# Patient Record
Sex: FEMALE | Race: BLACK OR AFRICAN AMERICAN
[De-identification: names, ages, dates, MRNs, and addresses within clinical notes are randomized per-mention and may not be internally consistent; named-entity substitution may affect disease eponyms.]

---

## 2017-10-31 ENCOUNTER — HOSPITAL ENCOUNTER (EMERGENCY)
Dept: HOSPITAL 62 - ER | Age: 47
Discharge: HOME | End: 2017-10-31
Payer: SELF-PAY

## 2017-10-31 VITALS — DIASTOLIC BLOOD PRESSURE: 94 MMHG | SYSTOLIC BLOOD PRESSURE: 154 MMHG

## 2017-10-31 DIAGNOSIS — R51: ICD-10-CM

## 2017-10-31 DIAGNOSIS — M54.2: ICD-10-CM

## 2017-10-31 DIAGNOSIS — B02.9: Primary | ICD-10-CM

## 2017-10-31 DIAGNOSIS — R03.0: ICD-10-CM

## 2017-10-31 DIAGNOSIS — R11.0: ICD-10-CM

## 2017-10-31 DIAGNOSIS — R21: ICD-10-CM

## 2017-10-31 PROCEDURE — 99282 EMERGENCY DEPT VISIT SF MDM: CPT

## 2017-10-31 NOTE — ER DOCUMENT REPORT
HPI





- HPI


Patient complains to provider of: Skin rash, head and neck pain


Onset: Other - 2 days


Onset/Duration: Persistent


Quality of pain: Sharp


Pain Level: 5


Context: 





Patient complains of pain to left occipital scalp area and left side of head.  

Patient does complain of the skin rash to posterior neck and left cheek area.  

Patient reports nausea.  Patient denies any fever.


Associated Symptoms: Headache, Other - Skin rash.  denies: Vomiting


Exacerbated by: Denies


Relieved by: Denies


Similar symptoms previously: No


Recently seen / treated by doctor: No





- ROS


ROS below otherwise negative: Yes


Systems Reviewed and Negative: Yes All other systems reviewed and negative





- CONSTITUTIONAL


Constitutional: DENIES: Fever





- NEURO


Neurology: REPORTS: Headache





- GASTROINTESTINAL


Gastrointestinal: REPORTS: Nausea.  DENIES: Patient vomiting





- MUSCULOSKELETAL


Musculoskeletal: REPORTS: Neck Pain





- DERM


Skin Color: Normal


Skin Problems: Rash





Past Medical History





- General


Information source: Patient





- Social History


Smoking Status: Never Smoker


Frequency of alcohol use: None


Drug Abuse: None


Occupation: [employer]


Lives with: Family


Family History: Reviewed & Not Pertinent


Patient has suicidal ideation: No


Patient has homicidal ideation: No


Renal/ Medical History: Denies: Hx Peritoneal Dialysis


Psychiatric Medical History: Reports: Hx Anxiety, Hx Depression


Past Surgical History: Reports: Hx Gynecologic Surgery, Hx Orthopedic Surgery





Vertical Provider Document





- CONSTITUTIONAL


Agree With Documented VS: Yes


Exam Limitations: No Limitations


General Appearance: WD/WN, No Apparent Distress





- INFECTION CONTROL


TRAVEL OUTSIDE OF THE [place] IN LAST 30 DAYS: No





- HEENT


HEENT: Atraumatic, Normocephalic





- NECK


Neck: Normal Inspection, Supple.  negative: Lymphadenopathy-Left, 

Lymphadenopathy-Right





- RESPIRATORY


Respiratory: Breath Sounds Normal, No Respiratory Distress


O2 Sat by Pulse Oximetry: 100





- CARDIOVASCULAR


Cardiovascular: Regular Rate, Regular Rhythm, No Murmur





- BACK


Back: Normal Inspection





- MUSCULOSKELETAL/EXTREMETIES


Musculoskeletal/Extremeties: MAEW





- NEURO


Level of Consciousness: Awake, Alert, Appropriate


Motor/Sensory: No Motor Deficit





- DERM


Integumentary: Warm, Dry, Rash - Vesicular rash to left posterior occipital 

area of scalp, excoriated papular rash to left posterior neck area, few 

scattered excoriated skin lesions to left lower cheek area





Course





- Re-evaluation


Re-evalutation: 





10/31/17 05:47


Pt with rash concerning for shingles and a C3 nerve dermatome distribution.  

Discussed worsening symptoms that patient should return immediately for.  

Patient verbalized understanding and agrees with plan of care.





- Vital Signs


Vital signs: 


 











Temp Pulse Resp BP Pulse Ox


 


 97.6 F   75   20   154/94 H  100 


 


 10/31/17 03:58  10/31/17 03:58  10/31/17 03:58  10/31/17 03:58  10/31/17 03:58














Discharge





- Discharge


Clinical Impression: 


 Elevated blood pressure reading





Shingles


Qualifiers:


 Herpes zoster complications: without complications Qualified Code(s): B02.9 - 

Zoster without complications





Condition: Stable


Disposition: HOME, SELF-CARE


Instructions:  Oral Narcotic Medication ([facility]), Shingles ([facility])


Additional Instructions: 


Return immediately for any new or worsening symptoms





Followup with your primary care provider, call tomorrow to make a followup 

appointment








Prescriptions: 


Oxycodone HCl/Acetaminophen [Percocet 5-325 mg Tablet] 1 - 2 tab PO ASDIR PRN #

15 tablet


 PRN Reason: 


Valacyclovir HCl [Valacyclovir] 1,000 mg PO TID #21 tablet


Forms:  Elevated Blood Pressure, Return to Work


Referrals: 


DARRELL PASCUAL MD [EMERITUS] - Follow up as needed

## 2018-10-03 ENCOUNTER — HOSPITAL ENCOUNTER (OUTPATIENT)
Dept: HOSPITAL 62 - WI | Age: 48
End: 2018-10-03
Attending: INTERNAL MEDICINE
Payer: COMMERCIAL

## 2018-10-03 DIAGNOSIS — Z12.31: Primary | ICD-10-CM

## 2018-10-03 PROCEDURE — 77067 SCR MAMMO BI INCL CAD: CPT

## 2018-10-04 NOTE — WOMENS IMAGING REPORT
EXAM DESCRIPTION:  BILAT SCREENING MAMMO W/CAD



COMPLETED DATE/TIME:  10/3/2018 7:52 am



REASON FOR STUDY:  SCREENING MAMMO Z12.31  ENCNTR SCREEN MAMMOGRAM FOR MALIGNANT NEOPLASM OF GERARDO



COMPARISON:  None.



TECHNIQUE:  Standard craniocaudal and mediolateral oblique views of each breast recorded using digita
l acquisition.



LIMITATIONS:  None.



FINDINGS:  No masses, calcifications or architectural distortion. No areas of suspicion.

Read with the assistance of CAD.

.[facility] - R2 Cenova Version 1.3

.[facility] - R2 Cenova Version 1.3

.[facility] - R2 Cenova Version 2.4

.[facility] [place] R2 [place]ova Version 2.4

.[facility] - R2  Version 9.2



IMPRESSION:  NORMAL MAMMOGRAM.  BIRADS 1.



BREAST DENSITY:  b. There are scattered areas of fibroglandular density.



BIRAD:  1 NEGATIVE



RECOMMENDATION:  ROUTINE SCREENING

Please continue yearly bilateral screening mammography/tomosynthesis in October 2019



COMMENT:  The patient has been notified of the results by letter per SA requirements. Additional no
tification policies are in place for contacting patient with suspicious or incomplete findings.

Quality ID #225: The American College of Radiology recommends an annual screening mammogram for women
 aged 40 years or over. This facility utilizes a reminder system to ensure that all patients receive 
reminder letters, and/or direct phone calls for appointments. This includes reminders for routine scr
eening mammograms, diagnostic mammograms, or other Breast Imaging Interventions when appropriate.  Th
is patient will be placed in the appropriate reminder system.

The American College of Radiology (ACR) has developed recommendations for screening MRI of the breast
s in certain patient populations, to be used in conjunction with mammography.  Breast MRI surveillanc
e may be appropriate for women with more than 20% lifetime risk of developing breast cancer  as deter
mined by genetic testing, significant family history of the disease, or history of mantle radiation f
or Hodgkins Disease.  ACR Practice Guidelines 2008.



TECHNICAL DOCUMENTATION:  FINDING NUMBER: (1)

ASSESSMENT: (1)

JOB ID:  [id number]

 2011 [employer]- All Rights Reserved



Reading location - IP/workstation name: [facility]

## 2019-01-30 LAB
ANION GAP SERPL CALC-SCNC: 9 MMOL/L (ref 5–19)
BUN SERPL-MCNC: 20 MG/DL (ref 7–20)
CALCIUM: 9.7 MG/DL (ref 8.4–10.2)
CHLORIDE SERPL-SCNC: 108 MMOL/L (ref 98–107)
CO2 SERPL-SCNC: 26 MMOL/L (ref 22–30)
ERYTHROCYTE [DISTWIDTH] IN BLOOD BY AUTOMATED COUNT: 13.7 % (ref 11.5–14)
GLUCOSE SERPL-MCNC: 104 MG/DL (ref 75–110)
HCT VFR BLD CALC: 37.5 % (ref 36–47)
HGB BLD-MCNC: 12.8 G/DL (ref 12–15.5)
MCH RBC QN AUTO: 30.5 PG (ref 27–33.4)
MCHC RBC AUTO-ENTMCNC: 34.1 G/DL (ref 32–36)
MCV RBC AUTO: 90 FL (ref 80–97)
PLATELET # BLD: 280 10^3/UL (ref 150–450)
POTASSIUM SERPL-SCNC: 4.8 MMOL/L (ref 3.6–5)
RBC # BLD AUTO: 4.19 10^6/UL (ref 3.72–5.28)
SODIUM SERPL-SCNC: 143 MMOL/L (ref 137–145)
WBC # BLD AUTO: 5.4 10^3/UL (ref 4–10.5)

## 2019-02-06 ENCOUNTER — HOSPITAL ENCOUNTER (OUTPATIENT)
Dept: HOSPITAL 62 - OROUT | Age: 49
Discharge: HOME | End: 2019-02-06
Attending: SURGERY
Payer: COMMERCIAL

## 2019-02-06 VITALS — DIASTOLIC BLOOD PRESSURE: 97 MMHG | SYSTOLIC BLOOD PRESSURE: 148 MMHG

## 2019-02-06 DIAGNOSIS — Z87.891: ICD-10-CM

## 2019-02-06 DIAGNOSIS — Z88.5: ICD-10-CM

## 2019-02-06 DIAGNOSIS — E55.9: ICD-10-CM

## 2019-02-06 DIAGNOSIS — Z79.1: ICD-10-CM

## 2019-02-06 DIAGNOSIS — E78.00: ICD-10-CM

## 2019-02-06 DIAGNOSIS — M06.9: ICD-10-CM

## 2019-02-06 DIAGNOSIS — R26.81: ICD-10-CM

## 2019-02-06 DIAGNOSIS — Z88.8: ICD-10-CM

## 2019-02-06 DIAGNOSIS — I10: ICD-10-CM

## 2019-02-06 DIAGNOSIS — D17.1: Primary | ICD-10-CM

## 2019-02-06 DIAGNOSIS — Z79.899: ICD-10-CM

## 2019-02-06 PROCEDURE — 85027 COMPLETE CBC AUTOMATED: CPT

## 2019-02-06 PROCEDURE — 36415 COLL VENOUS BLD VENIPUNCTURE: CPT

## 2019-02-06 PROCEDURE — 88304 TISSUE EXAM BY PATHOLOGIST: CPT

## 2019-02-06 PROCEDURE — 80048 BASIC METABOLIC PNL TOTAL CA: CPT

## 2019-02-06 PROCEDURE — 21933 EXC BACK TUM DEEP 5 CM/>: CPT

## 2019-02-06 NOTE — OPERATIVE REPORT
Operative Report


DATE OF SURGERY: 02/06/19


PREOPERATIVE DIAGNOSIS: Left upper back lipoma


POSTOPERATIVE DIAGNOSIS: Same


OPERATION: Complete excision of fibrofatty lipoma left upper back


SURGEON: FERNANDA ALBARRAN


1ST ASSISTANT: FIOR PERRY


ANESTHESIA: LMAC


TISSUE REMOVED OR ALTERED: Fibrofatty lipoma in components


COMPLICATIONS: 





None


ESTIMATED BLOOD LOSS: Scant


INTRAOPERATIVE FINDINGS: See below


PROCEDURE: 





The patient was in the preop holding area the left upper back lipoma was marked.

 She was then taken to the main operating room where LMAC anesthesia was 

induced.  She is placed in the right side down left side up decubitus position. 

Left upper back was prepped and draped sterile fashion





Surgical plan surgical timeout were conducted.





Markings were made on the skin for a planned transverse open excisional biopsy 

removal of the left upper back lipoma.  Skin was anesthetized with 1% plain 

lidocaine.  Approximately 6 cm incision was made full-thickness through the skin

overlying the lipoma.  The lipoma was now excised from the deep subcutaneous 

tissue all the way down to the fascia of the back using electrocautery.  This 

was a multilobulated, fibrofatty lipoma that came out in lobules.  Small 

vascular pedicles were cauterized as encountered.  After the debulking of the 

lipoma, the mass was felt to be excised in its entirety.  Hemostasis was 

excellent wound closed in layers with 3-0 Vicryl benzoin Steri-Strips and bulky 

dressing applied.





Patient tolerated procedure well, taken to recovery room in stable condition.





The physician assistant, Ms. Woods, provided assistance during this case by: 

Assisting with retracting tissue, instillation of local anesthesia and closure 

of skin incisions.

## 2019-02-06 NOTE — DISCHARGE SUMMARY
Discharge Summary (SDC)





- Discharge


Final Diagnosis: 





Left upper back lipoma


Date of Surgery: 02/06/19


Discharge Date: 02/06/19


Condition: Good


Treatment or Instructions: 


WOUND CARE: 


You may remove the gauze and tape in 48 hours. Leave steri strips (paper 

bandaids) in place until follow up. After 48 hours you may shower with warm 

water/soap, pat dry and cover if needed. Monitor the wound for signs of 

infection: drainage, redness, swelling, fever, chills.





FOLLOW UP:


You may follow up at [facility] in 7-10 days for evaluation. Call 

clinic sooner with questions/concerns. 


Prescriptions: 


Ketorolac Tromethamine [Toradol 10 mg Tablet] 10 mg PO Q6HP PRN #20 tablet


 PRN Reason: 


Referrals: 


SOUMYA OVERTON MD [Primary Care Provider] - 


Discharge Diet: As Tolerated


Discharge Activity: No Lifting/Push/Pulling, Walk Frequently


Report the Following to Your Physician Immediately: Fever over 101 Degrees, 

Unusual Bleeding, Redness, Increased Soreness, Drainage-Foul Smelling

## 2019-10-24 ENCOUNTER — HOSPITAL ENCOUNTER (OUTPATIENT)
Dept: HOSPITAL 62 - ER | Age: 49
Setting detail: OBSERVATION
LOS: 3 days | Discharge: HOME HEALTH | End: 2019-10-27
Attending: INTERNAL MEDICINE | Admitting: INTERNAL MEDICINE
Payer: COMMERCIAL

## 2019-10-24 DIAGNOSIS — M13.842: ICD-10-CM

## 2019-10-24 DIAGNOSIS — I63.9: Primary | ICD-10-CM

## 2019-10-24 DIAGNOSIS — F40.240: ICD-10-CM

## 2019-10-24 DIAGNOSIS — M13.861: ICD-10-CM

## 2019-10-24 DIAGNOSIS — I10: ICD-10-CM

## 2019-10-24 DIAGNOSIS — G89.29: ICD-10-CM

## 2019-10-24 DIAGNOSIS — Z73.3: ICD-10-CM

## 2019-10-24 DIAGNOSIS — Z79.82: ICD-10-CM

## 2019-10-24 DIAGNOSIS — M13.841: ICD-10-CM

## 2019-10-24 DIAGNOSIS — R29.704: ICD-10-CM

## 2019-10-24 DIAGNOSIS — M54.9: ICD-10-CM

## 2019-10-24 DIAGNOSIS — E78.5: ICD-10-CM

## 2019-10-24 DIAGNOSIS — Z82.49: ICD-10-CM

## 2019-10-24 DIAGNOSIS — Z79.899: ICD-10-CM

## 2019-10-24 DIAGNOSIS — F41.8: ICD-10-CM

## 2019-10-24 DIAGNOSIS — G81.94: ICD-10-CM

## 2019-10-24 DIAGNOSIS — F17.200: ICD-10-CM

## 2019-10-24 DIAGNOSIS — F11.20: ICD-10-CM

## 2019-10-24 DIAGNOSIS — F12.10: ICD-10-CM

## 2019-10-24 DIAGNOSIS — M47.892: ICD-10-CM

## 2019-10-24 LAB
ADD MANUAL DIFF: NO
ALBUMIN SERPL-MCNC: 4.7 G/DL (ref 3.5–5)
ALP SERPL-CCNC: 95 U/L (ref 38–126)
ANION GAP SERPL CALC-SCNC: 11 MMOL/L (ref 5–19)
APPEARANCE UR: (no result)
APTT BLD: 26.8 SEC (ref 23.5–35.8)
APTT PPP: (no result) S
AST SERPL-CCNC: 23 U/L (ref 14–36)
BARBITURATES UR QL SCN: NEGATIVE
BASOPHILS # BLD AUTO: 0.2 10^3/UL (ref 0–0.2)
BASOPHILS NFR BLD AUTO: 2.2 % (ref 0–2)
BILIRUB DIRECT SERPL-MCNC: 0.1 MG/DL (ref 0–0.4)
BILIRUB SERPL-MCNC: 0.6 MG/DL (ref 0.2–1.3)
BILIRUB UR QL STRIP: NEGATIVE
BUN SERPL-MCNC: 19 MG/DL (ref 7–20)
CALCIUM: 9.8 MG/DL (ref 8.4–10.2)
CHLORIDE SERPL-SCNC: 106 MMOL/L (ref 98–107)
CO2 SERPL-SCNC: 25 MMOL/L (ref 22–30)
EOSINOPHIL # BLD AUTO: 0.1 10^3/UL (ref 0–0.6)
EOSINOPHIL NFR BLD AUTO: 0.6 % (ref 0–6)
ERYTHROCYTE [DISTWIDTH] IN BLOOD BY AUTOMATED COUNT: 13.9 % (ref 11.5–14)
GLUCOSE SERPL-MCNC: 92 MG/DL (ref 75–110)
GLUCOSE UR STRIP-MCNC: NEGATIVE MG/DL
HCT VFR BLD CALC: 41.3 % (ref 36–47)
HGB BLD-MCNC: 13.7 G/DL (ref 12–15.5)
INR PPP: 0.92
KETONES UR STRIP-MCNC: (no result) MG/DL
LYMPHOCYTES # BLD AUTO: 4 10^3/UL (ref 0.5–4.7)
LYMPHOCYTES NFR BLD AUTO: 37.1 % (ref 13–45)
MCH RBC QN AUTO: 29.5 PG (ref 27–33.4)
MCHC RBC AUTO-ENTMCNC: 33.1 G/DL (ref 32–36)
MCV RBC AUTO: 89 FL (ref 80–97)
METHADONE UR QL SCN: NEGATIVE
MONOCYTES # BLD AUTO: 0.9 10^3/UL (ref 0.1–1.4)
MONOCYTES NFR BLD AUTO: 8 % (ref 3–13)
NEUTROPHILS # BLD AUTO: 5.6 10^3/UL (ref 1.7–8.2)
NEUTS SEG NFR BLD AUTO: 52.1 % (ref 42–78)
NITRITE UR QL STRIP: NEGATIVE
PCP UR QL SCN: NEGATIVE
PH UR STRIP: 5 [PH] (ref 5–9)
PLATELET # BLD: 188 10^3/UL (ref 150–450)
POTASSIUM SERPL-SCNC: 3.8 MMOL/L (ref 3.6–5)
PROT SERPL-MCNC: 7.8 G/DL (ref 6.3–8.2)
PROT UR STRIP-MCNC: 30 MG/DL
PROTHROMBIN TIME: 12.3 SEC (ref 11.4–15.4)
RBC # BLD AUTO: 4.65 10^6/UL (ref 3.72–5.28)
SP GR UR STRIP: 1.04
TOTAL CELLS COUNTED % (AUTO): 100 %
URINE AMPHETAMINES SCREEN: NEGATIVE
URINE BENZODIAZEPINES SCREEN: NEGATIVE
URINE COCAINE SCREEN: NEGATIVE
URINE MARIJUANA (THC) SCREEN: (no result)
UROBILINOGEN UR-MCNC: 2 MG/DL (ref ?–2)
WBC # BLD AUTO: 10.7 10^3/UL (ref 4–10.5)

## 2019-10-24 PROCEDURE — 85652 RBC SED RATE AUTOMATED: CPT

## 2019-10-24 PROCEDURE — G0378 HOSPITAL OBSERVATION PER HR: HCPCS

## 2019-10-24 PROCEDURE — 70544 MR ANGIOGRAPHY HEAD W/O DYE: CPT

## 2019-10-24 PROCEDURE — A9576 INJ PROHANCE MULTIPACK: HCPCS

## 2019-10-24 PROCEDURE — 96361 HYDRATE IV INFUSION ADD-ON: CPT

## 2019-10-24 PROCEDURE — 96374 THER/PROPH/DIAG INJ IV PUSH: CPT

## 2019-10-24 PROCEDURE — 36415 COLL VENOUS BLD VENIPUNCTURE: CPT

## 2019-10-24 PROCEDURE — 80048 BASIC METABOLIC PNL TOTAL CA: CPT

## 2019-10-24 PROCEDURE — 86140 C-REACTIVE PROTEIN: CPT

## 2019-10-24 PROCEDURE — 80307 DRUG TEST PRSMV CHEM ANLYZR: CPT

## 2019-10-24 PROCEDURE — 93010 ELECTROCARDIOGRAM REPORT: CPT

## 2019-10-24 PROCEDURE — 70547 MR ANGIOGRAPHY NECK W/O DYE: CPT

## 2019-10-24 PROCEDURE — 85730 THROMBOPLASTIN TIME PARTIAL: CPT

## 2019-10-24 PROCEDURE — 80053 COMPREHEN METABOLIC PANEL: CPT

## 2019-10-24 PROCEDURE — 72158 MRI LUMBAR SPINE W/O & W/DYE: CPT

## 2019-10-24 PROCEDURE — 72156 MRI NECK SPINE W/O & W/DYE: CPT

## 2019-10-24 PROCEDURE — 99285 EMERGENCY DEPT VISIT HI MDM: CPT

## 2019-10-24 PROCEDURE — 93005 ELECTROCARDIOGRAM TRACING: CPT

## 2019-10-24 PROCEDURE — 97530 THERAPEUTIC ACTIVITIES: CPT

## 2019-10-24 PROCEDURE — 85025 COMPLETE CBC W/AUTO DIFF WBC: CPT

## 2019-10-24 PROCEDURE — 81001 URINALYSIS AUTO W/SCOPE: CPT

## 2019-10-24 PROCEDURE — 72157 MRI CHEST SPINE W/O & W/DYE: CPT

## 2019-10-24 PROCEDURE — 85610 PROTHROMBIN TIME: CPT

## 2019-10-24 PROCEDURE — 70450 CT HEAD/BRAIN W/O DYE: CPT

## 2019-10-24 PROCEDURE — 97163 PT EVAL HIGH COMPLEX 45 MIN: CPT

## 2019-10-24 PROCEDURE — 70551 MRI BRAIN STEM W/O DYE: CPT

## 2019-10-24 NOTE — RADIOLOGY REPORT (SQ)
EXAM DESCRIPTION:  CT HEAD WITHOUT



COMPLETED DATE/TIME:  10/24/2019 4:14 pm



REASON FOR STUDY:  Headache left side, left-sided weakness



COMPARISON:  None.



TECHNIQUE:  Axial images acquired through the brain without intravenous contrast.  Images reviewed wi
th bone, brain and subdural windows.  Additional sagittal and coronal reconstructions were generated.
 Images stored on PACS.

All CT scanners at this facility use dose modulation, iterative reconstruction, and/or weight based d
osing when appropriate to reduce radiation dose to as low as reasonably achievable (ALARA).

[facility]: Dose Right  [facility]: CareDose    [facility]: Dose Right    [facility]: Teradose 4D    [facility]: Smart Yummy Food



RADIATION DOSE:   mGy.



LIMITATIONS:  None.



FINDINGS:  VENTRICLES: Normal size and contour.

CEREBRUM: No masses.  No hemorrhage.  No midline shift.  No evidence for acute infarction. Normal gra
y/white matter differentiation. No areas of low density in the white matter.

CEREBELLUM: No masses.  No hemorrhage.  No alteration of density.  No evidence for acute infarction.

EXTRAAXIAL SPACES: No fluid collections.  No masses.

ORBITS AND GLOBE: No intra- or extraconal masses.  Normal contour of globe without masses.

CALVARIUM: No fracture.

PARANASAL SINUSES: No fluid or mucosal thickening.

SOFT TISSUES: No mass or hematoma.

OTHER: No other significant finding.



IMPRESSION:  NORMAL BRAIN CT WITHOUT CONTRAST.

EVIDENCE OF ACUTE STROKE: NO.



COMMENT:  Quality ID # 436: Final reports with documentation of one or more dose reduction techniques
 (e.g., Automated exposure control, adjustment of the mA and/or kV according to patient size, use of 
iterative reconstruction technique)



TECHNICAL DOCUMENTATION:  JOB ID:  [id number]

 2011 [employer]- All Rights Reserved



Reading location - IP/workstation name: [email or web address]

## 2019-10-24 NOTE — ER DOCUMENT REPORT
ED General





- General


Chief Complaint: Headache


Stated Complaint: LEFT SIDE WEAKNESS


Time Seen by Provider: 10/24/19 15:20


Mode of Arrival: Wheelchair


Notes: 





Patient is a 48-year-old female with history of hypertension, hyperlipidemia 

that presents to the emergency department for chief complaint of headache and 

left-sided weakness.  Patient reports that on Saturday she started having 

weakness in the left side of her body, and did have some slurred speech at that 

time, that has since improved, but the weakness has persisted, she is gotten 

some improvement, but at this point she is unable to stand or walk.  She did try

to go to pain management and they advised to come to the emergency department as

they are concerned that she may have had a stroke over the past few days.  She 

denies prior history of stroke, she does not take any blood thinners.  She has 

numbness on her left side as well as weakness.  She states that her speech is 

gotten a little bit better, but her weakness is not.  She denies history of mi

graines.  She does currently rate her headache as a 3 out of 10 and describes it

as a throbbing sensation on the left side of her head.  Denies any photophobia, 

vision changes or vision loss.  She also denies having any associated nausea or 

vomiting.





Past Medical History: Hypertension, hyperlipidemia, chronic back pain


Past Surgical History: Denies recent or pertinent surgical history


Social History: Denies tobacco, alcohol, admits to marijuana use


Family History: Reviewed and noncontributory for presenting illness


Allergies: Reviewed, see documented allergy list. 





REVIEW OF SYSTEMS:


Other than noted above, the 12 point review of systems was reviewed with the 

patient and were negative, all pertinent findings are included in the HPI.





PHYSICAL EXAMINATION:





Vital signs reviewed, nursing noted reviewed. 





GENERAL: Well-appearing, well-nourished and in no acute distress.





HEAD: Atraumatic, normocephalic.





EYES: Eyes appear normal, extraocular movements intact, sclera anicteric, 

conjunctiva are normal.





ENT: nares patent, oropharynx clear without exudates.  Moist mucous membranes.





NECK: Normal range of motion, supple without lymphadenopathy





LUNGS: Breath sounds clear to auscultation bilaterally and equal.  No wheezes 

rales or rhonchi.





HEART: Regular rate and rhythm without murmurs





ABDOMEN: Soft, nontender, normoactive bowel sounds.  No rebound, guarding, or 

rigidity. No masses appreciated.





EXTREMITIES: Nontender, good range of motion, no pitting or edema.  





NEUROLOGICAL: Patient has a slight facial droop on the left, speech is intact, 

no aphasia, she does have weakness in the left upper and lower extremity, with 

slightly decreased sensation bilaterally, her NIH stroke scale score is 4 based 

on her lower extremity weakness, upper extremity and decreased sensation on the 

left.





PSYCH: Mildly anxious but answers questions appropriately





SKIN: Warm, Dry, normal turgor, no rashes or lesions noted on exposed skin





TRAVEL OUTSIDE OF THE [place] IN LAST 30 DAYS: No





- Related Data


Allergies/Adverse Reactions: 


                                        





meperidine [From Demerol] Allergy (Severe, Verified 10/24/19 15:21)


   Anaphylaxis


lisinopril Allergy (Intermediate, Verified 10/24/19 15:21)


   Dizziness


latex Allergy (Verified 10/24/19 15:21)


   Hives











Past Medical History





- General


Information source: Patient





- Social History


Smoking Status: Current Every Day Smoker


Chew tobacco use (# tins/day): No


Frequency of alcohol use: Occasional


Drug Abuse: Marijuana


Family History: Reviewed & Not Pertinent


Patient has suicidal ideation: No


Patient has homicidal ideation: No





- Past Medical History


Cardiac Medical History: Reports: Hx Hypertension - ON MEDICATIONS


   Denies: Hx Coronary Artery Disease, Hx Heart Attack


Pulmonary Medical History: 


   Denies: Hx Asthma, Hx Bronchitis, Hx COPD, Hx Pneumonia


Neurological Medical History: Denies: Hx Cerebrovascular Accident, Hx Seizures


Renal/ Medical History: Denies: Hx Peritoneal Dialysis


Musculoskeletal Medical History: Reports Hx Arthritis - RIGHT KNEE, JUSTO HANDS


Psychiatric Medical History: Reports: Hx Anxiety, Hx Depression


Past Surgical History: Reports: Hx Gynecologic Surgery, Hx Orthopedic Surgery





- Immunizations


Hx Diphtheria, Pertussis, Tetanus Vaccination: Yes





Physical Exam





- Vital signs


Vitals: 


                                        











Pulse Resp BP Pulse Ox


 


 85   20   168/107 H  98 


 


 10/24/19 15:21  10/24/19 15:21  10/24/19 15:21  10/24/19 15:21














Course





- Re-evaluation


Re-evalutation: 





Patient seen and examined, vital signs reviewed, on my exam the patient was 

noted to have motor deficits on the left as well as a sensory deficit.  

Concerning for CVA, likely occurred on Saturday.  CT was negative, blood work 

was essentially unremarkable, patient was treated with IV fluids, Reglan for hea

dache, and started on aspirin, I placed a call to the hospitalist.  Dr. Beard 

requested ordering MRI before admission to the hospital.  MRI MRA of the head 

and MRA of the neck were ordered.  Patient states that she is claustrophobic, 

she was given Ativan prior to obtaining MRI to help with her claustrophobia.





MRI department was notified, and there is no longer performing MRIs, at this 

hour, there are hours have changed, do not perform them after 1030 or leading up

to 1030.  Would have to be performed in the morning.  Call was placed back to 

Dr. Beard to discuss this.





Patient symptoms are most consistent with likely ischemic CVA, discussed case 

again with Dr. Beard, to admit the patient is MRI is unavailable at this time,

to have MRIs performed in the morning and I think she does need to see physical 

therapy and Occupational Therapy given her current motor and sensory deficits.  

Patient was agreeable to this plan of care and understood.








Laboratory











  10/24/19 10/24/19 10/24/19





  15:52 15:52 15:52


 


WBC  10.7 H  


 


RBC  4.65  


 


Hgb  13.7  


 


Hct  41.3  


 


MCV  89  


 


MCH  29.5  


 


MCHC  33.1  


 


RDW  13.9  


 


Plt Count  188  


 


Lymph % (Auto)  37.1  


 


Mono % (Auto)  8.0  


 


Eos % (Auto)  0.6  


 


Baso % (Auto)  2.2 H  


 


Absolute Neuts (auto)  5.6  


 


Absolute Lymphs (auto)  4.0  


 


Absolute Monos (auto)  0.9  


 


Absolute Eos (auto)  0.1  


 


Absolute Basos (auto)  0.2  


 


Seg Neutrophils %  52.1  


 


PT   12.3 


 


INR   0.92 


 


APTT   26.8 


 


Sodium    141.6


 


Potassium    3.8


 


Chloride    106


 


Carbon Dioxide    25


 


Anion Gap    11


 


BUN    19


 


Creatinine    0.82


 


Est GFR ( Amer)    > 60


 


Est GFR (MDRD) Non-Af    > 60


 


Glucose    92


 


Calcium    9.8


 


Total Bilirubin    0.6


 


Direct Bilirubin    0.1


 


Neonat Total Bilirubin    Not Reportable


 


Neonat Direct Bilirubin    Not Reportable


 


Neonat Indirect Bili    Not Reportable


 


AST    23


 


ALT    19


 


Alkaline Phosphatase    95


 


Total Protein    7.8


 


Albumin    4.7


 


Urine Color   


 


Urine Appearance   


 


Urine pH   


 


Ur Specific Gravity   


 


Urine Protein   


 


Urine Glucose (UA)   


 


Urine Ketones   


 


Urine Blood   


 


Urine Nitrite   


 


Urine Bilirubin   


 


Urine Urobilinogen   


 


Ur Leukocyte Esterase   


 


Urine WBC (Auto)   


 


Urine RBC (Auto)   


 


U Hyaline Cast (Auto)   


 


Urine Bacteria (Auto)   


 


Squamous Epi Cells Auto   


 


Urine Mucus (Auto)   


 


Urine Ascorbic Acid   


 


Urine Opiates Screen   


 


Urine Methadone Screen   


 


Ur Barbiturates Screen   


 


Ur Phencyclidine Scrn   


 


Ur Amphetamines Screen   


 


U Benzodiazepines Scrn   


 


Urine Cocaine Screen   


 


U Marijuana (THC) Screen   














  10/24/19 10/24/19





  15:52 15:52


 


WBC  


 


RBC  


 


Hgb  


 


Hct  


 


MCV  


 


MCH  


 


MCHC  


 


RDW  


 


Plt Count  


 


Lymph % (Auto)  


 


Mono % (Auto)  


 


Eos % (Auto)  


 


Baso % (Auto)  


 


Absolute Neuts (auto)  


 


Absolute Lymphs (auto)  


 


Absolute Monos (auto)  


 


Absolute Eos (auto)  


 


Absolute Basos (auto)  


 


Seg Neutrophils %  


 


PT  


 


INR  


 


APTT  


 


Sodium  


 


Potassium  


 


Chloride  


 


Carbon Dioxide  


 


Anion Gap  


 


BUN  


 


Creatinine  


 


Est GFR ( Amer)  


 


Est GFR (MDRD) Non-Af  


 


Glucose  


 


Calcium  


 


Total Bilirubin  


 


Direct Bilirubin  


 


Neonat Total Bilirubin  


 


Neonat Direct Bilirubin  


 


Neonat Indirect Bili  


 


AST  


 


ALT  


 


Alkaline Phosphatase  


 


Total Protein  


 


Albumin  


 


Urine Color  DARK YELLOW 


 


Urine Appearance  CLOUDY 


 


Urine pH  5.0 


 


Ur Specific Gravity  1.040 


 


Urine Protein  30 H 


 


Urine Glucose (UA)  NEGATIVE 


 


Urine Ketones  TRACE H 


 


Urine Blood  NEGATIVE 


 


Urine Nitrite  NEGATIVE 


 


Urine Bilirubin  NEGATIVE 


 


Urine Urobilinogen  2.0 H 


 


Ur Leukocyte Esterase  NEGATIVE 


 


Urine WBC (Auto)  8 


 


Urine RBC (Auto)  4 


 


U Hyaline Cast (Auto)  1 


 


Urine Bacteria (Auto)  TRACE 


 


Squamous Epi Cells Auto  16 


 


Urine Mucus (Auto)  MOD 


 


Urine Ascorbic Acid  NEGATIVE 


 


Urine Opiates Screen   UNCONFIRMED POSITIVE


 


Urine Methadone Screen   NEGATIVE


 


Ur Barbiturates Screen   NEGATIVE


 


Ur Phencyclidine Scrn   NEGATIVE


 


Ur Amphetamines Screen   NEGATIVE


 


U Benzodiazepines Scrn   NEGATIVE


 


Urine Cocaine Screen   NEGATIVE


 


U Marijuana (THC) Screen   UNCONFIRMED POSITIVE











                                        





Head CT  10/24/19 15:24


IMPRESSION:  NORMAL BRAIN CT WITHOUT CONTRAST.


EVIDENCE OF ACUTE STROKE: NO.


 

















10/25/19 00:32








- Vital Signs


Vital signs: 


                                        











Temp Pulse Resp BP Pulse Ox


 


 98.6 F   68   20   139/94 H  100 


 


 10/24/19 21:31  10/24/19 19:41  10/24/19 21:31  10/24/19 21:31  10/24/19 21:31














- Laboratory


Result Diagrams: 


                                 10/24/19 15:52





                                 10/24/19 15:52


Laboratory results interpreted by me: 


                                        











  10/24/19 10/24/19





  15:52 15:52


 


WBC  10.7 H 


 


Baso % (Auto)  2.2 H 


 


Urine Protein   30 H


 


Urine Ketones   TRACE H


 


Urine Urobilinogen   2.0 H














- EKG Interpretation by Me


Additional EKG results interpreted by me: 





EKG demonstrates sinus rhythm with a ventricular rate of 59 bpm, normal axis, n

ormal intervals, no ST elevation.








Discharge





- Discharge


Clinical Impression: 


 Left-sided weakness





CVA (cerebral vascular accident)


Qualifiers:


 CVA mechanism: unspecified Qualified Code(s): I63.9 - Cerebral infarction, 

unspecified





Condition: Stable


Disposition: ADMITTED AS INPATIENT


Admitting Provider: Chirag (Hospitalist)


Unit Admitted: [facility]

## 2019-10-24 NOTE — ER DOCUMENT REPORT
ED Medical Screen (RME)





- General


Chief Complaint: S/S of Possible Stroke


Stated Complaint: LEFT SIDE WEAKNESS


Time Seen by Provider: 10/24/19 15:20


Primary Care Provider: 


SOUMYA OVERTON MD [Primary Care Provider] - Follow up as needed


Mode of Arrival: Wheelchair


Information source: Patient


Notes: 





48-year-old female presented to ED for complaint of left-sided weakness since 

Saturday.  She went today to pain management they sent her to the emergency room

to get rule out for stroke.  She states that she has left-sided head pain with 

some numbness and left-sided weakness.  She states she did not come to the 

hospital at that time she has waited until pain management center to the 

emergency room.  States she is on oxycodone but she does not know the dose.











I have greeted and performed a rapid initial assessment of this patient.  A 

comprehensive ED assessment and evaluation of the patient, analysis of test 

results and completion of medical decision making process will be conducted by 

an additional ED providers.


TRAVEL OUTSIDE OF THE [place] IN LAST 30 DAYS: No





- Related Data


Allergies/Adverse Reactions: 


                                        





meperidine [From Demerol] Allergy (Severe, Verified 02/06/19 07:35)


   Anaphylaxis


lisinopril Allergy (Intermediate, Verified 02/06/19 07:35)


   Dizziness


latex Allergy (Verified 02/06/19 07:35)


   Hives











Past Medical History





- Past Medical History


Cardiac Medical History: Reports: Hx Hypertension - ON MEDICATIONS


   Denies: Hx Coronary Artery Disease, Hx Heart Attack


Pulmonary Medical History: 


   Denies: Hx Asthma, Hx Bronchitis, Hx COPD, Hx Pneumonia


Neurological Medical History: Denies: Hx Cerebrovascular Accident, Hx Seizures


Renal/ Medical History: Denies: Hx Peritoneal Dialysis


Musculoskeltal Medical History: Reports Hx Arthritis - RIGHT KNEE, JUSTO HANDS


Psychiatric Medical History: Reports: Hx Anxiety, Hx Depression


Past Surgical History: Reports: Hx Gynecologic Surgery, Hx Orthopedic Surgery





- Immunizations


Hx Diphtheria, Pertussis, Tetanus Vaccination: Yes





Doctor's Discharge





- Discharge


Referrals: 


SOUMYA OVERTON MD [Primary Care Provider] - Follow up as needed

## 2019-10-25 LAB
ADD MANUAL DIFF: NO
ANION GAP SERPL CALC-SCNC: 10 MMOL/L (ref 5–19)
BASOPHILS # BLD AUTO: 0.1 10^3/UL (ref 0–0.2)
BASOPHILS NFR BLD AUTO: 0.7 % (ref 0–2)
BUN SERPL-MCNC: 15 MG/DL (ref 7–20)
CALCIUM: 8.5 MG/DL (ref 8.4–10.2)
CHLORIDE SERPL-SCNC: 109 MMOL/L (ref 98–107)
CO2 SERPL-SCNC: 22 MMOL/L (ref 22–30)
EOSINOPHIL # BLD AUTO: 0.1 10^3/UL (ref 0–0.6)
EOSINOPHIL NFR BLD AUTO: 0.8 % (ref 0–6)
ERYTHROCYTE [DISTWIDTH] IN BLOOD BY AUTOMATED COUNT: 13.7 % (ref 11.5–14)
GLUCOSE SERPL-MCNC: 102 MG/DL (ref 75–110)
HCT VFR BLD CALC: 36.9 % (ref 36–47)
HGB BLD-MCNC: 12.3 G/DL (ref 12–15.5)
LYMPHOCYTES # BLD AUTO: 2.6 10^3/UL (ref 0.5–4.7)
LYMPHOCYTES NFR BLD AUTO: 31.4 % (ref 13–45)
MCH RBC QN AUTO: 29.6 PG (ref 27–33.4)
MCHC RBC AUTO-ENTMCNC: 33.5 G/DL (ref 32–36)
MCV RBC AUTO: 89 FL (ref 80–97)
MONOCYTES # BLD AUTO: 0.7 10^3/UL (ref 0.1–1.4)
MONOCYTES NFR BLD AUTO: 8.2 % (ref 3–13)
NEUTROPHILS # BLD AUTO: 4.9 10^3/UL (ref 1.7–8.2)
NEUTS SEG NFR BLD AUTO: 58.9 % (ref 42–78)
PLATELET # BLD: 214 10^3/UL (ref 150–450)
POTASSIUM SERPL-SCNC: 3.7 MMOL/L (ref 3.6–5)
RBC # BLD AUTO: 4.17 10^6/UL (ref 3.72–5.28)
TOTAL CELLS COUNTED % (AUTO): 100 %
WBC # BLD AUTO: 8.4 10^3/UL (ref 4–10.5)

## 2019-10-25 RX ADMIN — Medication SCH ML: at 15:28

## 2019-10-25 RX ADMIN — Medication SCH ML: at 21:22

## 2019-10-25 RX ADMIN — Medication SCH ML: at 05:25

## 2019-10-25 RX ADMIN — OXYCODONE AND ACETAMINOPHEN PRN TAB: 5; 325 TABLET ORAL at 18:41

## 2019-10-25 RX ADMIN — GABAPENTIN SCH MG: 300 CAPSULE ORAL at 21:22

## 2019-10-25 RX ADMIN — ASPIRIN SCH MG: 325 TABLET, DELAYED RELEASE ORAL at 09:14

## 2019-10-25 RX ADMIN — GABAPENTIN SCH: 300 CAPSULE ORAL at 15:00

## 2019-10-25 RX ADMIN — ATORVASTATIN CALCIUM SCH MG: 80 TABLET, FILM COATED ORAL at 21:22

## 2019-10-25 RX ADMIN — HEPARIN SODIUM SCH UNIT: 5000 INJECTION, SOLUTION INTRAVENOUS; SUBCUTANEOUS at 21:22

## 2019-10-25 RX ADMIN — HEPARIN SODIUM SCH UNIT: 5000 INJECTION, SOLUTION INTRAVENOUS; SUBCUTANEOUS at 05:25

## 2019-10-25 RX ADMIN — HEPARIN SODIUM SCH UNIT: 5000 INJECTION, SOLUTION INTRAVENOUS; SUBCUTANEOUS at 15:55

## 2019-10-25 RX ADMIN — GABAPENTIN SCH MG: 300 CAPSULE ORAL at 15:26

## 2019-10-25 NOTE — RADIOLOGY REPORT (SQ)
EXAM DESCRIPTION:  MRA HEAD WITHOUT



COMPLETED DATE/TIME:  10/25/2019 11:56 am



REASON FOR STUDY:  left sided weakness



COMPARISON:  MRI brain 10/25/2019

CT brain 10/24/2019



TECHNIQUE:  Axial 3-D time-of-flight acquisition imaging performed through the brain in the area of t
he [place].  Images reformatted using 3-D MIPS.



LIMITATIONS:  Mild motion artifact



FINDINGS:  SOURCE IMAGES: No unexpected findings on source images.  No large masses.

3-D MIP: No aneurysm.  No occlusions.  No significant stenosis.

OTHER: No other significant finding.



IMPRESSION:  NORMAL MRA OF THE [place].



TECHNICAL DOCUMENTATION:  JOB ID:  [id number]

 2011 [employer]- All Rights Reserved



Reading location - IP/workstation name: [email or web address]

## 2019-10-25 NOTE — PDOC H&P
History of Present Illness


Admission Date/PCP: 


  10/24/19 23:27





  SOUMYA OVERTON





Patient complains of: Headache and left-sided weakness


History of Present Illness: 


MARIS MESA is a 48 year old female with a past medical history of 

hypertension, tobacco, dyslipidemia, fibromyalgia, opiate dependent chronic 

pain, anxiety and depression.  She presents 6 days after the onset of a headache

which was followed by left-sided weakness and intermittent tingling of the tong

ue.  She has had no fluctuation of symptoms and it remained constant.  She 

ambulates with a cane at baseline and has been able to work as a  since the 

onset.  She was seen by pain management with deficits and referred to the 

emergency department for evaluation.  Her work-up is unremarkable she denies 

palpitations headache nausea vomiting.  She receives aspirin and referred to the

hospitalist for admission.  She denies previous episode, recent change in 

medication regiment.





Past Medical History


Cardiac Medical History: Reports: Hypertension - ON MEDICATIONS


   Denies: Coronary Artery Disease, Myocardial Infarction


Pulmonary Medical History: 


   Denies: Asthma, Bronchitis, Chronic Obstructive Pulmonary Disease (COPD), 

Pneumonia


Neurological Medical History: 


   Denies: Seizures


Musculoskeltal Medical History: Reports: Arthritis - RIGHT KNEE, JUSTO HANDS


Psychiatric Medical History: Reports: Depression - anxiety


Hematology: 


   Denies: Anemia





Past Surgical History


Past Surgical History: Reports: Orthopedic Surgery





Social History


Information Source: Patient, Emergency Med Personnel


Lives with: Spouse/Significant other


Smoking Status: Never Smoker


Electronic Cigarette use?: No


Frequency of Alcohol Use: None


Drugs: Marijuana


Hx Prescription Drug Abuse: No





- Advance Directive


Resuscitation Status: Full Code





Family History


Family History: Hypertension.  denies: CAD, CVA


Parental Family History Reviewed: Yes


Children Family History Reviewed: Yes


Sibling(s) Family History Reviewed.: Yes





Medication/Allergy


Home Medications: 








Atorvastatin Calcium [Lipitor 10 mg Tablet] 10 mg PO QHS 01/30/19 


Atenolol [Tenormin] 50 mg PO DAILY 10/25/19 


Ergocalciferol (Vitamin D2) [Drisdol 50,000 Unit (1.25MG) Capsule] 50,000 unit 

PO MO 10/25/19 


Gabapentin [Neurontin 300 mg Capsule] 300 mg PO Q8 10/25/19 


Losartan/Hydrochlorothiazide [Losartan-Hctz 100-25 mg Tab] 1 each PO DAILY 

10/25/19 


Oxycodone HCl/Acetaminophen [Percocet 5-325 mg Tablet] 1 tab PO QIDP PRN 

10/25/19 








Allergies/Adverse Reactions: 


                                        





meperidine [From Demerol] Allergy (Severe, Verified 10/24/19 15:21)


   Anaphylaxis


lisinopril Allergy (Intermediate, Verified 10/24/19 15:21)


   Dizziness


latex Allergy (Verified 10/24/19 15:21)


   Hives











Review of Systems


ROS unobtainable: Due to mental status - Patient felt an unreliable historian 

his answers affirmatively to all questions





Physical Exam


Vital Signs: 


                                        











Temp Pulse Resp BP Pulse Ox


 


 98.0 F   73   16   108/65   100 


 


 10/25/19 04:08  10/25/19 04:08  10/25/19 04:08  10/25/19 04:08  10/25/19 04:08








                                 Intake & Output











 10/23/19 10/24/19 10/25/19





 11:59 11:59 11:59


 


Intake Total   1000


 


Balance   1000


 


Weight   84.9 kg











General appearance: PRESENT: cooperative, mild distress, well-developed, well-

nourished


Head exam: PRESENT: atraumatic, normocephalic


Eye exam: PRESENT: conjunctiva pink, EOMI, PERRLA.  ABSENT: scleral icterus


Ear exam: PRESENT: normal external ear exam


Mouth exam: PRESENT: moist, tongue midline


Neck exam: ABSENT: carotid bruit, JVD, lymphadenopathy, thyromegaly


Respiratory exam: PRESENT: clear to auscultation justo.  ABSENT: rales, rhonchi, 

wheezes


Cardiovascular exam: PRESENT: RRR.  ABSENT: diastolic murmur, rubs, systolic 

murmur


Pulses: PRESENT: normal dorsalis pedis pul


Vascular exam: PRESENT: normal capillary refill


GI/Abdominal exam: PRESENT: normal bowel sounds, soft.  ABSENT: distended, 

guarding, mass, organolmegaly, rebound, tenderness


Rectal exam: PRESENT: deferred


Extremities exam: PRESENT: full ROM.  ABSENT: calf tenderness, clubbing, pedal 

edema, tenderness


Musculoskeletal exam: PRESENT: full ROM, other - 4+out of 5 strength on the left

side, questionable effort


Neurological exam: PRESENT: alert, awake, oriented to person, oriented to place,

oriented to time, oriented to situation, CN II-XII grossly intact.  ABSENT: 

motor sensory deficit


Psychiatric exam: PRESENT: anxious, depressed, unusual affect.  ABSENT: 

homicidal ideation, suicidal ideation


Skin exam: PRESENT: dry, intact, warm.  ABSENT: cyanosis, rash





Results


Laboratory Results: 


                                        





                                 10/24/19 15:52 





                                 10/24/19 15:52 





                                        











  10/24/19 10/24/19 10/24/19





  15:52 15:52 15:52


 


WBC  10.7 H  


 


RBC  4.65  


 


Hgb  13.7  


 


Hct  41.3  


 


MCV  89  


 


MCH  29.5  


 


MCHC  33.1  


 


RDW  13.9  


 


Plt Count  188  


 


Seg Neutrophils %  52.1  


 


Sodium   141.6 


 


Potassium   3.8 


 


Chloride   106 


 


Carbon Dioxide   25 


 


Anion Gap   11 


 


BUN   19 


 


Creatinine   0.82 


 


Est GFR ( Amer)   > 60 


 


Glucose   92 


 


Calcium   9.8 


 


Total Bilirubin   0.6 


 


AST   23 


 


Alkaline Phosphatase   95 


 


Total Protein   7.8 


 


Albumin   4.7 


 


Urine Color    DARK YELLOW


 


Urine Appearance    CLOUDY


 


Urine pH    5.0


 


Ur Specific Gravity    1.040


 


Urine Protein    30 H


 


Urine Glucose (UA)    NEGATIVE


 


Urine Ketones    TRACE H


 


Urine Blood    NEGATIVE


 


Urine Nitrite    NEGATIVE


 


Ur Leukocyte Esterase    NEGATIVE


 


Urine WBC (Auto)    8


 


Urine RBC (Auto)    4











Impressions: 


                                        





Head CT  10/24/19 15:24


IMPRESSION:  NORMAL BRAIN CT WITHOUT CONTRAST.


EVIDENCE OF ACUTE STROKE: NO.


 














Assessment and Plan





- Diagnosis


(1) CVA (cerebral vascular accident)


Qualifiers: 


   CVA mechanism: unspecified   Qualified Code(s): I63.9 - Cerebral infarction, 

unspecified   


Is this a current diagnosis for this admission?: Yes   


Plan: 


CVA care set deployed, follow-up MRI, carotid Doppler, lipid profile, PT eval








(2) Depression with anxiety


Is this a current diagnosis for this admission?: Yes   


Plan: 


Denies suicidal or homicidal ideation, counseled for cannabis cessation








(3) Cannabis abuse


Is this a current diagnosis for this admission?: Yes   


Plan: 


Counseling for substance cessation








(4) Chronic pain


Is this a current diagnosis for this admission?: Yes   


Plan: 


Limit narcotics, follow-up medication reconciliation








(5) Hypertension


Is this a current diagnosis for this admission?: Yes   


Plan: 


Permissive hypertension








(6) Dyslipidemia


Is this a current diagnosis for this admission?: Yes   


Plan: 


Statin ordered, follow-up lipid profile








(7) Left-sided weakness


Is this a current diagnosis for this admission?: Yes   


Plan: 


Unclear cause, follow-up PT eval and CVA work-up








- Time


Time Spent with patient: 25-34 minutes





- Inpatient Certification


Medical Necessity: Need Close Monitoring Due to Risk of Patient Decompensation

## 2019-10-25 NOTE — RADIOLOGY REPORT (SQ)
EXAM DESCRIPTION:  MRI HEAD WITHOUT



COMPLETED DATE/TIME:  10/25/2019 11:56 am



REASON FOR STUDY:  left sided weakness



COMPARISON:  None.



TECHNIQUE:  Multiplanar imaging includes non-contrasted T1, T2, FLAIR, and diffusion with ADC map seq
uences. Images stored on PACS.



LIMITATIONS:  None.



FINDINGS:  ANATOMY: No anomalies. Normal vascular flow voids. Pituitary fossa normal.

CSF SPACES: Normal in size and contour. No hemorrhage.

CEREBRUM: Sulci and gyri normal in size and contour. Normal white matter signal on FLAIR imaging.  No
 evidence of hemorrhage, mass, or extraaxial fluid collection.

POSTERIOR FOSSA: No signal alteration. No hemorrhage. No edema, masses or mass effect.  Internal domonique
tory canals, cerebello-pontine angles, mastoids normal.

DIFFUSION IMAGING: Negative for acute or sub-acute infarction.

ORBITS: No masses. Globes normal.

PARANASAL  SINUSES: No fluid levels.  Mucosa normal.

OTHER: No other significant finding.



IMPRESSION:  NORMAL MRI OF THE BRAIN WITHOUT INTRAVENOUS GADOLINIUM CONTRAST.

EVIDENCE OF ACUTE STROKE: NO.



TECHNICAL DOCUMENTATION:  JOB ID:  [id number]

 2011 [email or web address]- All Rights Reserved



Reading location - IP/workstation name: [email or web address]

## 2019-10-25 NOTE — EKG REPORT
SEVERITY:- BORDERLINE ECG -

SINUS RHYTHM

BORDERLINE T ABNORMALITIES, ANTERIOR LEADS

:

Confirmed by: Apple Mario MD 25-Oct-2019 19:06:32

## 2019-10-25 NOTE — PDOC PROGRESS REPORT
Subjective


Progress Note for:: 10/25/19


Subjective:: 


This is a 48 year old female with a past medical history of hypertension, 

tobacco, dyslipidemia, fibromyalgia, opiate dependent chronic pain, anxiety and 

depression who presented with left sided weakness with onset 6 days ago.  

Patient was admitted for possible subacute CVA.





No acute event overnight. Upon encounter, patient is comfortable.  She continues

to have left-sided weakness in both her left arm and left leg.  MRI studies 

pending.  Denies chest pain or shortness of breath.


Reason For Visit: 


CVA HTN TOBACCO








Physical Exam


Vital Signs: 


                                        











Temp Pulse Resp BP Pulse Ox


 


 97.8 F   62   16   136/81 H  100 


 


 10/25/19 07:53  10/25/19 07:53  10/25/19 07:53  10/25/19 07:53  10/25/19 07:53








                                 Intake & Output











 10/24/19 10/25/19 10/26/19





 06:59 06:59 06:59


 


Intake Total  1100 


 


Output Total  100 


 


Balance  1000 


 


Weight  187 lb 2.759 oz 











General appearance: PRESENT: no acute distress, well-developed, well-nourished


Head exam: PRESENT: atraumatic, normocephalic


Eye exam: PRESENT: conjunctiva pink, EOMI, PERRLA.  ABSENT: scleral icterus


Ear exam: PRESENT: normal external ear exam


Mouth exam: PRESENT: moist, tongue midline


Neck exam: ABSENT: carotid bruit, JVD, lymphadenopathy, thyromegaly


Respiratory exam: PRESENT: clear to auscultation wayne.  ABSENT: rales, rhonchi, 

wheezes


Cardiovascular exam: PRESENT: RRR.  ABSENT: diastolic murmur, rubs, systolic 

murmur


Pulses: PRESENT: normal dorsalis pedis pul


GI/Abdominal exam: PRESENT: normal bowel sounds, soft.  ABSENT: distended, 

guarding, mass, organolmegaly, rebound, tenderness


Rectal exam: PRESENT: deferred


Extremities exam: PRESENT: full ROM.  ABSENT: calf tenderness, clubbing, pedal 

edema


Neurological exam: PRESENT: alert, awake, oriented to person, oriented to place,

oriented to time, oriented to situation, CN II-XII grossly intact, motor sensory

deficit - 2/5 LSW





Results


Laboratory Results: 


                                        





                                 10/25/19 05:41 





                                 10/25/19 05:41 





                                        











  10/24/19 10/24/19 10/24/19





  15:52 15:52 15:52


 


WBC  10.7 H  


 


RBC  4.65  


 


Hgb  13.7  


 


Hct  41.3  


 


MCV  89  


 


MCH  29.5  


 


MCHC  33.1  


 


RDW  13.9  


 


Plt Count  188  


 


Seg Neutrophils %  52.1  


 


Sodium   141.6 


 


Potassium   3.8 


 


Chloride   106 


 


Carbon Dioxide   25 


 


Anion Gap   11 


 


BUN   19 


 


Creatinine   0.82 


 


Est GFR ( Amer)   > 60 


 


Glucose   92 


 


Calcium   9.8 


 


Total Bilirubin   0.6 


 


AST   23 


 


Alkaline Phosphatase   95 


 


Total Protein   7.8 


 


Albumin   4.7 


 


Urine Color    DARK YELLOW


 


Urine Appearance    CLOUDY


 


Urine pH    5.0


 


Ur Specific Gravity    1.040


 


Urine Protein    30 H


 


Urine Glucose (UA)    NEGATIVE


 


Urine Ketones    TRACE H


 


Urine Blood    NEGATIVE


 


Urine Nitrite    NEGATIVE


 


Ur Leukocyte Esterase    NEGATIVE


 


Urine WBC (Auto)    8


 


Urine RBC (Auto)    4














  10/25/19 10/25/19





  05:41 05:41


 


WBC  8.4 


 


RBC  4.17 


 


Hgb  12.3 


 


Hct  36.9 


 


MCV  89 


 


MCH  29.6 


 


MCHC  33.5 


 


RDW  13.7 


 


Plt Count  214 


 


Seg Neutrophils %  58.9 


 


Sodium   140.6


 


Potassium   3.7


 


Chloride   109 H


 


Carbon Dioxide   22


 


Anion Gap   10


 


BUN   15


 


Creatinine   0.65


 


Est GFR ([place])   > 60


 


Glucose   102


 


Calcium   8.5


 


Total Bilirubin  


 


AST  


 


Alkaline Phosphatase  


 


Total Protein  


 


Albumin  


 


Urine Color  


 


Urine Appearance  


 


Urine pH  


 


Ur Specific Gravity  


 


Urine Protein  


 


Urine Glucose (UA)  


 


Urine Ketones  


 


Urine Blood  


 


Urine Nitrite  


 


Ur Leukocyte Esterase  


 


Urine WBC (Auto)  


 


Urine RBC (Auto)  











Impressions: 


                                        





Head CT  10/24/19 15:24


IMPRESSION:  NORMAL BRAIN CT WITHOUT CONTRAST.


EVIDENCE OF ACUTE STROKE: NO.


 














Assessment and Plan





- Diagnosis


(1) Left-sided weakness


Is this a current diagnosis for this admission?: Yes   


Plan: 


Patient is admitted for possible subacute CVA.  CT of the head was unremarkable.

 MRI studies pending.








(2) CVA (cerebral vascular accident)


Qualifiers: 


   CVA mechanism: unspecified   Qualified Code(s): I63.9 - Cerebral infarction, 

unspecified   


Is this a current diagnosis for this admission?: Yes   


Plan: 


As per #1.








(3) Hypertension


Is this a current diagnosis for this admission?: Yes   





(4) Depression with anxiety


Is this a current diagnosis for this admission?: Yes   


Plan: 


Stable.








- Time


Time Spent with patient: 25-34 minutes

## 2019-10-25 NOTE — RADIOLOGY REPORT (SQ)
EXAM DESCRIPTION:  MRA NECK WITHOUT



COMPLETED DATE/TIME:  10/25/2019 11:56 am



REASON FOR STUDY:  left sided weakness



COMPARISON:  MRI brain same date, MRA exam [place]

CT brain 10/24/2019



TECHNIQUE:  Axial 2-D volume acquisition imaging through the extracranial carotid and vertebral arter
ies with reformatting using 3-D MIPS.



LIMITATIONS:  Mild motion artifact



FINDINGS:  RIGHT CAROTID ARTERY: No stenosis or occlusive changes.  Limited visualization of the orig
in.

LEFT CAROTID ARTERY: No stenosis or occlusive changes.  Limited visualization of the origin.

VERTEBRAL ARTERY: The extracranial portions of the vertebral basilar system are preserved without loyda
nosis. No aneurysmal dilatation or dissection is seen.

OTHER: No other significant finding.



IMPRESSION:  NO SIGNIFICANT STENOSIS.



COMMENT:  Quality ID #[id number]:  Measurements of distal internal carotid diameter were used as the denomin
ator for stenosis measurement.



TECHNICAL DOCUMENTATION:  JOB ID:  [id number]

 2011 [employer]- All Rights Reserved



Reading location - IP/workstation name: [email or web address]

## 2019-10-26 RX ADMIN — Medication SCH ML: at 06:06

## 2019-10-26 RX ADMIN — HEPARIN SODIUM SCH UNIT: 5000 INJECTION, SOLUTION INTRAVENOUS; SUBCUTANEOUS at 21:26

## 2019-10-26 RX ADMIN — OXYCODONE AND ACETAMINOPHEN PRN TAB: 5; 325 TABLET ORAL at 18:51

## 2019-10-26 RX ADMIN — OXYCODONE AND ACETAMINOPHEN PRN TAB: 5; 325 TABLET ORAL at 06:05

## 2019-10-26 RX ADMIN — OXYCODONE AND ACETAMINOPHEN PRN TAB: 5; 325 TABLET ORAL at 13:08

## 2019-10-26 RX ADMIN — GABAPENTIN SCH MG: 300 CAPSULE ORAL at 13:10

## 2019-10-26 RX ADMIN — GABAPENTIN SCH MG: 300 CAPSULE ORAL at 06:05

## 2019-10-26 RX ADMIN — ATORVASTATIN CALCIUM SCH MG: 80 TABLET, FILM COATED ORAL at 21:26

## 2019-10-26 RX ADMIN — GABAPENTIN SCH MG: 300 CAPSULE ORAL at 21:26

## 2019-10-26 RX ADMIN — HEPARIN SODIUM SCH: 5000 INJECTION, SOLUTION INTRAVENOUS; SUBCUTANEOUS at 13:09

## 2019-10-26 RX ADMIN — OXYCODONE AND ACETAMINOPHEN PRN TAB: 5; 325 TABLET ORAL at 00:34

## 2019-10-26 RX ADMIN — Medication SCH ML: at 18:52

## 2019-10-26 RX ADMIN — ASPIRIN SCH MG: 325 TABLET, DELAYED RELEASE ORAL at 11:13

## 2019-10-26 RX ADMIN — HEPARIN SODIUM SCH UNIT: 5000 INJECTION, SOLUTION INTRAVENOUS; SUBCUTANEOUS at 06:05

## 2019-10-26 RX ADMIN — Medication SCH ML: at 21:26

## 2019-10-26 NOTE — RADIOLOGY REPORT (SQ)
EXAM DESCRIPTION:  MRI THORACIC SPINE COMBO



COMPLETED DATE/TIME:  10/26/2019 3:28 pm



REASON FOR STUDY:  persistent significant LSW, normal brain imaging



COMPARISON:  None.



TECHNIQUE:  Sagittal and Axial imaging includes T1, T2, STIR and gradient echo sequences.  T1 post ga
dolinium sequences.



CONTRAST TYPE AND DOSE:  15 mL Dotarem.



RENAL FUNCTION:  Not indicated.  ACR Type II contrast agent associated with few, if any, unconfounded
 cases of NSF



LIMITATIONS:  None.



FINDINGS:  LOCALIZER: No worrisome findings.

ALIGNMENT: Normal.

VERTEBRAE: Intact.

BONE MARROW: Normal. No marrow replacement or reactive changes.

HARDWARE: None in the spine.

CORD: Normal in size and signal intensity.

SOFT TISSUES: No soft tissue masses.

THORACIC DISCS T1-T12: No significant spinal stenosis or exit foraminal stenosis.  Minimal disc osteo
phyte complex T4-5.

LOWER CERVICAL: Incompletely imaged. No significant spinal stenosis or exit foraminal stenosis.

UPPER LUMBAR: Incompletely imaged.  No significant spinal stenosis or exit foraminal stenosis.

ENHANCEMENT: No abnormal enhancement.

OTHER: No other significant finding.



IMPRESSION:  No significant spinal stenosis or exit foraminal stenosis.  No enhancing lesions.



TECHNICAL DOCUMENTATION:  JOB ID:  [id number]

 2011 [employer]- All Rights Reserved



Reading location - IP/workstation name: [email or web address]

## 2019-10-26 NOTE — RADIOLOGY REPORT (SQ)
EXAM DESCRIPTION:  MRI CERVICAL SPINE COMBO



COMPLETED DATE/TIME:  10/26/2019 3:28 pm



REASON FOR STUDY:  persistent significant LSW, normal brain imaging



COMPARISON:  None.



TECHNIQUE:  Sagittal and Axial imaging includes T1, T2, STIR and gradient echo sequences. T1 post radha
olinium sequences.



CONTRAST TYPE AND DOSE:  15 mL Dotarem.



RENAL FUNCTION:  Not indicated.  ACR Type II contrast agent associated with few, if any, unconfounded
 cases of NSF



LIMITATIONS:  Considerable motion.



FINDINGS:  ALIGNMENT: Normal.

VERTEBRAE: Intact.

BONE MARROW: Normal. No marrow replacement or reactive changes.

DISCS: Normal. No significant abnormal signal or loss of height.

HARDWARE: None in the spine.

CORD AND BASE OF BRAIN: Normal in size and signal intensity.

SOFT TISSUES: No soft tissue masses.

C1-C2: No significant spinal stenosis.

C2-C3: No significant spinal stenosis or exit foraminal stenosis.

C3-C4: No significant spinal stenosis or exit foraminal stenosis.  Mild disc osteophyte complex.

C4-C5: No significant spinal stenosis or exit foraminal stenosis.

C5-C6: Mild disc osteophyte complex with mild narrowing of the exit foramina.

C6-C7: Mild disc osteophyte complex with mild narrowing of the exit foramina.

C7-T1: No significant spinal stenosis or exit foraminal stenosis.

UPPER THORACIC: Incompletely imaged. No significant spinal stenosis or exit foraminal stenosis.

ENHANCEMENT: No abnormal enhancement.

OTHER: No other significant finding.



IMPRESSION:  No enhancing lesions.  Mild spondylosis.  No critical stenosis.



COMMENT:  None.



TECHNICAL DOCUMENTATION:  JOB ID:  [id number]

 2011 [employer]- All Rights Reserved



Reading location - IP/workstation name: [email or web address]

## 2019-10-26 NOTE — PDOC PROGRESS REPORT
Subjective


Progress Note for:: 10/26/19


Subjective:: 


This is a 48 year old female with a past medical history of hypertension, 

tobacco, dyslipidemia, fibromyalgia, opiate dependent chronic pain, anxiety and 

depression who presented with left sided weakness with onset 6 days ago.  

Patient was admitted for possible subacute CVA.





10/25: Upon encounter, patient is comfortable.  She continues to have left-sided

weakness in both her left arm and left leg.  MRI studies pending.  Denies chest 

pain or shortness of breath.





10/26: No acute event overnight.  She continues significant left arm and left 

leg weakness.  She is complaining of point tenderness on her upper mid back.  

Will pursue an MRI of the spine to rule out any spinal lesion causing her left-

sided weakness.


Reason For Visit: 


CVA HTN TOBACCO








Physical Exam


Vital Signs: 


                                        











Temp Pulse Resp BP Pulse Ox


 


 98.0 F   56 L  16   139/79 H  99 


 


 10/26/19 07:48  10/26/19 07:48  10/26/19 07:48  10/26/19 07:48  10/26/19 07:48








                                 Intake & Output











 10/25/19 10/26/19 10/27/19





 06:59 06:59 06:59


 


Intake Total 1100 200 


 


Output Total 100 600 


 


Balance 1000 -400 


 


Weight 187 lb 2.759 oz 188 lb 7.924 oz 











General appearance: PRESENT: no acute distress, well-developed, well-nourished


Head exam: PRESENT: atraumatic, normocephalic


Eye exam: PRESENT: conjunctiva pink, EOMI, PERRLA.  ABSENT: scleral icterus


Ear exam: PRESENT: normal external ear exam


Mouth exam: PRESENT: moist, tongue midline


Neck exam: ABSENT: carotid bruit, JVD, lymphadenopathy, thyromegaly


Respiratory exam: PRESENT: clear to auscultation wayne.  ABSENT: rales, rhonchi, 

wheezes


Cardiovascular exam: PRESENT: RRR.  ABSENT: diastolic murmur, rubs, systolic 

murmur


Pulses: PRESENT: normal dorsalis pedis pul


GI/Abdominal exam: PRESENT: normal bowel sounds, soft.  ABSENT: distended, 

guarding, mass, organolmegaly, rebound, tenderness


Rectal exam: PRESENT: deferred


Extremities exam: PRESENT: full ROM.  ABSENT: calf tenderness, clubbing, pedal 

edema


Neurological exam: PRESENT: alert, awake, oriented to person, oriented to place,

oriented to time, oriented to situation, CN II-XII grossly intact, motor sensory

deficit - 2/5 LSW





Results


Laboratory Results: 


                                        





                                 10/25/19 05:41 





                                 10/25/19 05:41 








Impressions: 


                                        





Head CT  10/24/19 15:24


IMPRESSION:  NORMAL BRAIN CT WITHOUT CONTRAST.


EVIDENCE OF ACUTE STROKE: NO.


 








Brain MRI with MRA  10/25/19 00:00


IMPRESSION:  NORMAL MRA OF THE [place].


 








Head MRI  10/25/19 00:00


IMPRESSION:  NORMAL MRI OF THE BRAIN WITHOUT INTRAVENOUS GADOLINIUM CONTRAST.


EVIDENCE OF ACUTE STROKE: NO.


 








Neck MRA  10/25/19 00:00


IMPRESSION:  NO SIGNIFICANT STENOSIS.


 














Assessment and Plan





- Diagnosis


(1) Left-sided weakness


Is this a current diagnosis for this admission?: Yes   


Plan: 


Patient is admitted for possible subacute CVA.  CT of the head was unremarkable.

 MRI studies pending.





10/26: All brain imagin including CT and MRI studies are unremakrable. No CVA.  

She continues significant left arm and left leg weakness.  She is complaining of

point tenderness on her upper mid back.  Will pursue an MRI of the spine to rule

out any spinal lesion causing her left-sided weakness.








(2) CVA (cerebral vascular accident)


Qualifiers: 


   CVA mechanism: unspecified   Qualified Code(s): I63.9 - Cerebral infarction, 

unspecified   


Is this a current diagnosis for this admission?: Yes   


Plan: 


As per #1.








(3) Hypertension


Is this a current diagnosis for this admission?: Yes   


Plan: 


Resume losartan and HCTZ.








(4) Depression with anxiety


Is this a current diagnosis for this admission?: Yes   





- Time


Time Spent with patient: 25-34 minutes

## 2019-10-26 NOTE — RADIOLOGY REPORT (SQ)
EXAM DESCRIPTION:  MRI LUMBAR SPINE COMBO



COMPLETED DATE/TIME:  10/26/2019 3:28 pm



REASON FOR STUDY:  persistent significant LSW, normal brain imaging



COMPARISON:  None.



TECHNIQUE:  Sagittal and Axial imaging includes T1, T1 post gadolinium, T2, STIR and gradient echo se
quences. Coronal T2/HASTE imaging.



CONTRAST TYPE AND DOSE:  15 mL Dotarem.



RENAL FUNCTION:  Not indicated.  ACR Type II contrast agent associated with few, if any, unconfounded
 cases of NSF



LIMITATIONS:  None.



FINDINGS:  VISUALIZED UPPER ABDOMEN:  Limited evaluation. No acute or suspicious findings suggested.

SEGMENTATION: No transitional anatomy. The lowest well-developed disc space is labeled L5-S1.

ALIGNMENT: Anatomic.

VERTEBRAE: Intact.  No fractures.

BONE MARROW: Normal. No marrow replacement or reactive changes.

DISC SIGNAL: Loss of T2 signal L5-S1.

POSTERIOR ELEMENTS:  Generally intact.  No pars defect evident.

HARDWARE: None in the spine.

CORD AND CONUS: Normal in size and signal intensity. Conus at the appropriate level.

SOFT TISSUES: No aortic aneurysm seen. No bulky retroperitoneal adenopathy or mass. No paraspinal mas
s or fluid.

L1-L2: No significant spinal stenosis or exit foraminal stenosis.

L2-L3: No significant spinal stenosis or exit foraminal stenosis.

L3-L4: No significant spinal stenosis or exit foraminal stenosis.

L4-L5: No significant spinal stenosis or exit foraminal stenosis.

L5-S1: Minimal bulge in central fissure.  Mi exit foraminal stenosis bilateral.  .

LOWER THORACIC: Incompletely imaged.  No stenosis seen.

SACRUM: Visualized upper sacrum intact.

ENHANCEMENT: No abnormal enhancement.

OTHER: No other significant findings.



IMPRESSION:  Mild degenerative changes L5-S1 with mild exit foraminal stenosis.  No critical spinal s
tenosis or exit foraminal stenosis at any level.



TECHNICAL DOCUMENTATION:  JOB ID:  [id number]

 2011 [employer]- All Rights Reserved



Reading location - IP/workstation name: [email or web address]

## 2019-10-27 VITALS — SYSTOLIC BLOOD PRESSURE: 129 MMHG | DIASTOLIC BLOOD PRESSURE: 75 MMHG

## 2019-10-27 RX ADMIN — OXYCODONE AND ACETAMINOPHEN PRN TAB: 5; 325 TABLET ORAL at 01:25

## 2019-10-27 RX ADMIN — OXYCODONE AND ACETAMINOPHEN PRN TAB: 5; 325 TABLET ORAL at 10:31

## 2019-10-27 RX ADMIN — Medication SCH ML: at 13:08

## 2019-10-27 RX ADMIN — OXYCODONE AND ACETAMINOPHEN PRN TAB: 5; 325 TABLET ORAL at 06:09

## 2019-10-27 RX ADMIN — HEPARIN SODIUM SCH UNIT: 5000 INJECTION, SOLUTION INTRAVENOUS; SUBCUTANEOUS at 13:08

## 2019-10-27 RX ADMIN — HEPARIN SODIUM SCH UNIT: 5000 INJECTION, SOLUTION INTRAVENOUS; SUBCUTANEOUS at 06:09

## 2019-10-27 RX ADMIN — ASPIRIN SCH MG: 325 TABLET, DELAYED RELEASE ORAL at 10:30

## 2019-10-27 RX ADMIN — GABAPENTIN SCH MG: 300 CAPSULE ORAL at 06:09

## 2019-10-27 RX ADMIN — Medication SCH ML: at 06:09

## 2019-10-27 RX ADMIN — GABAPENTIN SCH MG: 300 CAPSULE ORAL at 13:08

## 2019-10-27 NOTE — PDOC DISCHARGE SUMMARY
Impression





- Admit/DC Date/PCP


Admission Date/Primary Care Provider: 


  10/24/19 23:27





  SOUMYA OVERTON





Discharge Date: 10/27/19





- Discharge Diagnosis


(1) Left-sided weakness


Is this a current diagnosis for this admission?: Yes   





(2) CVA (cerebral vascular accident)


Is this a current diagnosis for this admission?: Yes   





(3) Hypertension


Is this a current diagnosis for this admission?: Yes   





(4) Depression with anxiety


Is this a current diagnosis for this admission?: Yes   





- Additional Information


Resuscitation Status: Full Code


Discharge Diet: Cardiac


Discharge Activity: Activity As Tolerated


Referrals: 


JEANNINE GUERRERO MD [ACTIVE STAFF] - 11/05/19 9:00 am (The patient will 

see Dr. Terry Holley.)


SOUMYA OVRETON MD [Primary Care Provider] - 11/04/19 9:15 am


Prescriptions: 


Aspirin [Adult Aspirin Regimen] 81 mg PO DAILY #30 tablet.


Home Medications: 








Atorvastatin Calcium [Lipitor 10 mg Tablet] 10 mg PO QHS 01/30/19 


Atenolol [Tenormin] 50 mg PO DAILY 10/25/19 


Ergocalciferol (Vitamin D2) [Drisdol 50,000 unit (1.25MG) Capsule] 50,000 unit 

PO MO@1000 10/25/19 


Gabapentin [Neurontin 300 mg Capsule] 300 mg PO Q8 10/25/19 


Losartan/Hydrochlorothiazide [Losartan-Hctz 100-25 mg Tab] 1 each PO DAILY 

10/25/19 


Oxycodone HCl/Acetaminophen [Percocet 5-325 mg Tablet] 1 tab PO QIDP PRN 

10/25/19 


Aspirin [Adult Aspirin Regimen] 81 mg PO DAILY #30 tablet. 10/27/19 











History of Present Illiness


History of Present Illness: 


Admitting hospitalist's H&P:





MARIS MESA is a 48 year old female with a past medical history of 

hypertension, tobacco, dyslipidemia, fibromyalgia, opiate dependent chronic 

pain, anxiety and depression.  She presents 6 days after the onset of a headache

which was followed by left-sided weakness and intermittent tingling of the 

tongue.  She has had no fluctuation of symptoms and it remained constant.  She 

ambulates with a cane at baseline and has been able to work as a  since the 

onset.  She was seen by pain management with deficits and referred to the 

emergency department for evaluation.  Her work-up is unremarkable she denies 

palpitations headache nausea vomiting.  She receives aspirin and referred to the

hospitalist for admission.  She denies previous episode, recent change in 

medication regiment.





Hospital Course


Hospital Course: 


This is a 48 year old female with a past medical history of hypertension, 

tobacco, dyslipidemia, fibromyalgia, opiate dependent chronic pain, anxiety and 

depression who presented with left sided weakness with onset 6 days ago.  

Patient was admitted for possible subacute CVA.





An extensive neuro work-up was pursued.  CT of head was unremarkable.  MRI and 

MRA neck and brain were also normal.  She continued to complain of left-sided 

weakness, an MRI with contrast of the spine was also pursued.  This came back 

unremarkable.





On further reassessment on day of discharge, patient's left-sided weakness 

cannot be attributed to a nervous system lesion.  Repeated weakness and strength

testing of her hand were not consistent with a few testing encounters demons

trating she is a little stronger than she was on the same day.  She denies 

recent stressors but does report she has been more stressed than usual as her 

birthday is coming up and she is overwhelmed about organizing and preparing for 

it.  She denies suicidal or homicidal ideations.  No delusions or 

hallucinations.  Suspect her left sided weakness is probably nonorganic in 

nature.





Physical Exam


Vital Signs: 


                                        











Temp Pulse Resp BP Pulse Ox


 


 98.4 F   66   20   129/75 H  100 


 


 10/27/19 13:51  10/27/19 13:51  10/27/19 13:51  10/27/19 13:51  10/27/19 13:51








                                 Intake & Output











 10/26/19 10/27/19 10/28/19





 06:59 06:59 06:59


 


Intake Total 200 980 


 


Output Total 600 950 


 


Balance -400 30 


 


Weight 188 lb 7.924 oz 191 lb 2.252 oz 











General appearance: PRESENT: no acute distress, well-developed, well-nourished


Head exam: PRESENT: atraumatic, normocephalic


Eye exam: PRESENT: conjunctiva pink, EOMI, PERRLA.  ABSENT: scleral icterus


Ear exam: PRESENT: normal external ear exam


Mouth exam: PRESENT: moist, tongue midline


Neck exam: ABSENT: carotid bruit, JVD, lymphadenopathy, thyromegaly


Respiratory exam: PRESENT: clear to auscultation wayne.  ABSENT: rales, rhonchi, 

wheezes


Cardiovascular exam: PRESENT: RRR.  ABSENT: diastolic murmur, rubs, systolic 

murmur


Pulses: PRESENT: normal dorsalis pedis pul


GI/Abdominal exam: PRESENT: normal bowel sounds, soft.  ABSENT: distended, 

guarding, mass, organolmegaly, rebound, tenderness


Rectal exam: PRESENT: deferred


Extremities exam: PRESENT: full ROM.  ABSENT: calf tenderness, clubbing, pedal 

edema


Neurological exam: PRESENT: alert, awake, oriented to person, oriented to place,

oriented to time





Results


Laboratory Results: 


                                        











WBC  8.4 10^3/uL (4.0-10.5)   10/25/19  05:41    


 


RBC  4.17 10^6/uL (3.72-5.28)   10/25/19  05:41    


 


Hgb  12.3 g/dL (12.0-15.5)   10/25/19  05:41    


 


Hct  36.9 % (36.0-47.0)   10/25/19  05:41    


 


MCV  89 fl (80-97)   10/25/19  05:41    


 


MCH  29.6 pg (27.0-33.4)   10/25/19  05:41    


 


MCHC  33.5 g/dL (32.0-36.0)   10/25/19  05:41    


 


RDW  13.7 % (11.5-14.0)   10/25/19  05:41    


 


Plt Count  214 10^3/uL (150-450)   10/25/19  05:41    


 


Lymph % (Auto)  31.4 % (13-45)   10/25/19  05:41    


 


Mono % (Auto)  8.2 % (3-13)   10/25/19  05:41    


 


Eos % (Auto)  0.8 % (0-6)   10/25/19  05:41    


 


Baso % (Auto)  0.7 % (0-2)   10/25/19  05:41    


 


Absolute Neuts (auto)  4.9 10^3/uL (1.7-8.2)   10/25/19  05:41    


 


Absolute Lymphs (auto)  2.6 10^3/uL (0.5-4.7)   10/25/19  05:41    


 


Absolute Monos (auto)  0.7 10^3/uL (0.1-1.4)   10/25/19  05:41    


 


Absolute Eos (auto)  0.1 10^3/uL (0.0-0.6)   10/25/19  05:41    


 


Absolute Basos (auto)  0.1 10^3/uL (0.0-0.2)   10/25/19  05:41    


 


Seg Neutrophils %  58.9 % (42-78)   10/25/19  05:41    


 


ESR  11 mm/hr (0-20)   10/26/19  12:16    


 


PT  12.3 SEC (11.4-15.4)   10/24/19  15:52    


 


INR  0.92   10/24/19  15:52    


 


APTT  26.8 SEC (23.5-35.8)   10/24/19  15:52    


 


Sodium  140.6 mmol/L (137-145)   10/25/19  05:41    


 


Potassium  3.7 mmol/L (3.6-5.0)   10/25/19  05:41    


 


Chloride  109 mmol/L ()  H  10/25/19  05:41    


 


Carbon Dioxide  22 mmol/L (22-30)   10/25/19  05:41    


 


Anion Gap  10  (5-19)   10/25/19  05:41    


 


BUN  15 mg/dL (7-20)   10/25/19  05:41    


 


Creatinine  0.65 mg/dL (0.52-1.25)   10/25/19  05:41    


 


Est GFR ( Amer)  > 60  (>60)   10/25/19  05:41    


 


Est GFR (MDRD) Non-Af  > 60  (>60)   10/25/19  05:41    


 


Glucose  102 mg/dL ()   10/25/19  05:41    


 


Calcium  8.5 mg/dL (8.4-10.2)   10/25/19  05:41    


 


Total Bilirubin  0.6 mg/dL (0.2-1.3)   10/24/19  15:52    


 


Direct Bilirubin  0.1 mg/dL (0.0-0.4)   10/24/19  15:52    


 


Neonat Total Bilirubin  Not Reportable   10/24/19  15:52    


 


Neonat Direct Bilirubin  Not Reportable   10/24/19  15:52    


 


Neonat Indirect Bili  Not Reportable   10/24/19  15:52    


 


AST  23 U/L (14-36)   10/24/19  15:52    


 


ALT  19 U/L (<35)   10/24/19  15:52    


 


Alkaline Phosphatase  95 U/L ()   10/24/19  15:52    


 


C-Reactive Protein  < 5.0 mg/L (<10.0)   10/26/19  12:16    


 


Total Protein  7.8 g/dL (6.3-8.2)   10/24/19  15:52    


 


Albumin  4.7 g/dL (3.5-5.0)   10/24/19  15:52    


 


Urine Color  DARK YELLOW   10/24/19  15:52    


 


Urine Appearance  CLOUDY   10/24/19  15:52    


 


Urine pH  5.0  (5.0-9.0)   10/24/19  15:52    


 


Ur Specific Gravity  1.040   10/24/19  15:52    


 


Urine Protein  30 mg/dL (NEGATIVE)  H  10/24/19  15:52    


 


Urine Glucose (UA)  NEGATIVE mg/dL (NEGATIVE)   10/24/19  15:52    


 


Urine Ketones  TRACE mg/dL (NEGATIVE)  H  10/24/19  15:52    


 


Urine Blood  NEGATIVE  (NEGATIVE)   10/24/19  15:52    


 


Urine Nitrite  NEGATIVE  (NEGATIVE)   10/24/19  15:52    


 


Urine Bilirubin  NEGATIVE  (NEGATIVE)   10/24/19  15:52    


 


Urine Urobilinogen  2.0 mg/dL (<2.0)  H  10/24/19  15:52    


 


Ur Leukocyte Esterase  NEGATIVE  (NEGATIVE)   10/24/19  15:52    


 


Urine WBC (Auto)  8 /HPF  10/24/19  15:52    


 


Urine RBC (Auto)  4 /HPF  10/24/19  15:52    


 


U Hyaline Cast (Auto)  1 /LPF  10/24/19  15:52    


 


Urine Bacteria (Auto)  TRACE /HPF  10/24/19  15:52    


 


Squamous Epi Cells Auto  16 /HPF  10/24/19  15:52    


 


Urine Mucus (Auto)  MOD /LPF  10/24/19  15:52    


 


Urine Ascorbic Acid  NEGATIVE  (NEGATIVE)   10/24/19  15:52    


 


Urine Opiates Screen  UNCONFIRMED POSITIVE   10/24/19  15:52    


 


Urine Methadone Screen  NEGATIVE   10/24/19  15:52    


 


Ur Barbiturates Screen  NEGATIVE   10/24/19  15:52    


 


Ur Phencyclidine Scrn  NEGATIVE   10/24/19  15:52    


 


Ur Amphetamines Screen  NEGATIVE   10/24/19  15:52    


 


U Benzodiazepines Scrn  NEGATIVE   10/24/19  15:52    


 


Urine Cocaine Screen  NEGATIVE   10/24/19  15:52    


 


U Marijuana (THC) Screen  UNCONFIRMED POSITIVE   10/24/19  15:52    











Impressions: 


                                        





Head CT  10/24/19 15:24


IMPRESSION:  NORMAL BRAIN CT WITHOUT CONTRAST.


EVIDENCE OF ACUTE STROKE: NO.


 








Brain MRI with MRA  10/25/19 00:00


IMPRESSION:  NORMAL MRA OF THE [place] OF [place].


 








Head MRI  10/25/19 00:00


IMPRESSION:  NORMAL MRI OF THE BRAIN WITHOUT INTRAVENOUS GADOLINIUM CONTRAST.


EVIDENCE OF ACUTE STROKE: NO.


 








Neck MRA  10/25/19 00:00


IMPRESSION:  NO SIGNIFICANT STENOSIS.


 








Cervical Spine MRI  10/26/19 08:46


IMPRESSION:  No enhancing lesions.  Mild spondylosis.  No critical stenosis.


 








Thoracic Spine MRI  10/26/19 08:47


IMPRESSION:  No significant spinal stenosis or exit foraminal stenosis.  No 

enhancing lesions.


 








Lumbar Spine MRI  10/26/19 08:48


IMPRESSION:  Mild degenerative changes L5-S1 with mild exit foraminal stenosis. 

No critical spinal stenosis or exit foraminal stenosis at any level.


 














Stroke


Is this a Stroke Patient?: No





Acute Heart Failure





- **


Is this a Heart Failure Patient?: No

## 2020-09-09 ENCOUNTER — HOSPITAL ENCOUNTER (OUTPATIENT)
Dept: HOSPITAL 62 - RAD | Age: 50
End: 2020-09-09
Attending: PHYSICIAN ASSISTANT
Payer: COMMERCIAL

## 2020-09-09 DIAGNOSIS — M25.562: Primary | ICD-10-CM

## 2020-09-09 NOTE — RADIOLOGY REPORT (SQ)
EXAM DESCRIPTION:  MRI LT LOWER JOINT WITHOUT



IMAGES COMPLETED DATE/TIME:  9/9/2020 11:24 am



REASON FOR STUDY:  M25.562 PAIN IN LEFT KNEE M25.562  PAIN IN LEFT KNEE



COMPARISON:  None.



TECHNIQUE:  Leftknee images acquired and stored on PACS.  Multiplanar images include fat sensitive se
quences as T1, water sensitive sequences as FST2 or STIR, cartilage sensitive sequences as FSPD, and 
gradient echo sequences.



LIMITATIONS:  Motion.



FINDINGS:  JOINT AND BURSAE: Small effusion.

BONE CORTEX AND MARROW: No alteration of signal to suggest marrow replacement. No worrisome bone lesi
ons. No occult fracture.

ACL: Intact. No degeneration or ganglion cyst.

PCL: Intact.

MCL: Intact. No periligamentous edema or fluid.

LCL: Intact. No periligamentous edema or fluid.

MEDIAL MENISCUS: No tears. No abnormal signal.

LATERAL MENISCUS: Increased signal anterior horn without definite extension to the articular surface.


MEDIAL COMPARTMENT: Cartilage preserved. No bone bruises or reactive marrow edema. No osteophytes.

LATERAL COMPARTMENT: Cartilage preserved. No bone bruises or reactive marrow edema. No osteophytes.

PATELLA: No chondromalacia. No subchondral cysts. Medial and lateral retinacula intact.

EXTENSOR MECHANISM: Intact. Quadriceps and patella tendons normal.

SOFT TISSUES: Adjacent muscles and subcutaneous tissues normal.  Normal flow void in popliteal artery
 and vein.

OTHER: No other significant finding.



IMPRESSION:  Motion artifact.  Small joint effusion.



TECHNICAL DOCUMENTATION:  JOB ID:  [id number]

 2011 [employer]- All Rights Reserved



Reading location - IP/workstation name: [email or web address]

## 2020-12-29 ENCOUNTER — HOSPITAL ENCOUNTER (EMERGENCY)
Dept: HOSPITAL 62 - ER | Age: 50
LOS: 1 days | Discharge: HOME | End: 2020-12-30
Payer: COMMERCIAL

## 2020-12-29 DIAGNOSIS — R07.89: Primary | ICD-10-CM

## 2020-12-29 DIAGNOSIS — Z88.5: ICD-10-CM

## 2020-12-29 DIAGNOSIS — Z91.040: ICD-10-CM

## 2020-12-29 DIAGNOSIS — Z86.73: ICD-10-CM

## 2020-12-29 DIAGNOSIS — Z79.899: ICD-10-CM

## 2020-12-29 DIAGNOSIS — Z88.6: ICD-10-CM

## 2020-12-29 DIAGNOSIS — Z88.8: ICD-10-CM

## 2020-12-29 DIAGNOSIS — I10: ICD-10-CM

## 2020-12-29 LAB
ADD MANUAL DIFF: NO
ALBUMIN SERPL-MCNC: 4.5 G/DL (ref 3.5–5)
ALP SERPL-CCNC: 90 U/L (ref 38–126)
ANION GAP SERPL CALC-SCNC: 8 MMOL/L (ref 5–19)
AST SERPL-CCNC: 34 U/L (ref 14–36)
BASOPHILS # BLD AUTO: 0.1 10^3/UL (ref 0–0.2)
BASOPHILS NFR BLD AUTO: 0.7 % (ref 0–2)
BILIRUB DIRECT SERPL-MCNC: 0.2 MG/DL (ref 0–0.4)
BILIRUB SERPL-MCNC: 0.9 MG/DL (ref 0.2–1.3)
BUN SERPL-MCNC: 18 MG/DL (ref 7–20)
CALCIUM: 10.2 MG/DL (ref 8.4–10.2)
CHLORIDE SERPL-SCNC: 106 MMOL/L (ref 98–107)
CO2 SERPL-SCNC: 28 MMOL/L (ref 22–30)
EOSINOPHIL # BLD AUTO: 0.1 10^3/UL (ref 0–0.6)
EOSINOPHIL NFR BLD AUTO: 0.9 % (ref 0–6)
ERYTHROCYTE [DISTWIDTH] IN BLOOD BY AUTOMATED COUNT: 13.9 % (ref 11.5–14)
GLUCOSE SERPL-MCNC: 106 MG/DL (ref 75–110)
HCT VFR BLD CALC: 39.4 % (ref 36–47)
HGB BLD-MCNC: 13.6 G/DL (ref 12–15.5)
LYMPHOCYTES # BLD AUTO: 3.3 10^3/UL (ref 0.5–4.7)
LYMPHOCYTES NFR BLD AUTO: 36.2 % (ref 13–45)
MCH RBC QN AUTO: 30.3 PG (ref 27–33.4)
MCHC RBC AUTO-ENTMCNC: 34.5 G/DL (ref 32–36)
MCV RBC AUTO: 88 FL (ref 80–97)
MONOCYTES # BLD AUTO: 0.6 10^3/UL (ref 0.1–1.4)
MONOCYTES NFR BLD AUTO: 6.3 % (ref 3–13)
NEUTROPHILS # BLD AUTO: 5.1 10^3/UL (ref 1.7–8.2)
NEUTS SEG NFR BLD AUTO: 55.9 % (ref 42–78)
PLATELET # BLD: 272 10^3/UL (ref 150–450)
POTASSIUM SERPL-SCNC: 3.9 MMOL/L (ref 3.6–5)
PROT SERPL-MCNC: 7.4 G/DL (ref 6.3–8.2)
RBC # BLD AUTO: 4.49 10^6/UL (ref 3.72–5.28)
TOTAL CELLS COUNTED % (AUTO): 100 %
WBC # BLD AUTO: 9.1 10^3/UL (ref 4–10.5)

## 2020-12-29 PROCEDURE — 93010 ELECTROCARDIOGRAM REPORT: CPT

## 2020-12-29 PROCEDURE — 85025 COMPLETE CBC W/AUTO DIFF WBC: CPT

## 2020-12-29 PROCEDURE — 84484 ASSAY OF TROPONIN QUANT: CPT

## 2020-12-29 PROCEDURE — 96372 THER/PROPH/DIAG INJ SC/IM: CPT

## 2020-12-29 PROCEDURE — 36415 COLL VENOUS BLD VENIPUNCTURE: CPT

## 2020-12-29 PROCEDURE — 93005 ELECTROCARDIOGRAM TRACING: CPT

## 2020-12-29 PROCEDURE — 80053 COMPREHEN METABOLIC PANEL: CPT

## 2020-12-29 PROCEDURE — 71046 X-RAY EXAM CHEST 2 VIEWS: CPT

## 2020-12-29 PROCEDURE — 99285 EMERGENCY DEPT VISIT HI MDM: CPT

## 2020-12-29 NOTE — ER DOCUMENT REPORT
ED Medical Screen (RME)





- General


Chief Complaint: Chest Pain


Stated Complaint: CHEST PAIN,LEFT ARM PAIN


Time Seen by Provider: 12/29/20 17:03


Primary Care Provider: 


SOUMYA OVERTON MD [Primary Care Provider] - Follow up as needed


Mode of Arrival: Wheelchair


Information source: Patient


Notes: 





HPI; 50-year-old female past medical history significant for hypertension and a 

CVA presents to the emergency room complaining of left-sided chest pain that 

radiates to her back.  Describes it as aching shooting and burning.  States her 

blood pressure has been elevated at home to 174/105 has been taking her 

medications as prescribed.  She denies any nausea, vomiting, no shortness of 

breath or difficulty breathing.  No diaphoresis.  No COVID-19 exposure.  Has not

taken any medications for her symptoms.





PE:


Alert and oriented x3.  Lungs: Clear to auscultation without rales, rhonchi, 

wheezes.  Heart: Regular rate rhythm without murmurs, rubs, gallops.





I have greeted and performed a rapid initial assessment of this patient.  A 

comprehensive ED assessment and evaluation of the patient, analysis of test 

results and completion of the medical decision making process will be conducted 

by additional ED providers.  I have specifically instructed the patient or 

family members with the patient to immediately return to any nursing staff 

should anything change in the patient's condition or with their chief complaint.


TRAVEL OUTSIDE OF THE [place] IN LAST 30 DAYS: No





- Related Data


Allergies/Adverse Reactions: 


                                        





meperidine [From Demerol] Allergy (Severe, Verified 10/24/19 15:21)


   Anaphylaxis


lisinopril Allergy (Intermediate, Verified 10/24/19 15:21)


   Dizziness


latex Allergy (Verified 10/24/19 15:21)


   Hives











Past Medical History





- Past Medical History


Cardiac Medical History: Reports: Hx Hypertension - ON MEDICATIONS


   Denies: Hx Coronary Artery Disease, Hx Heart Attack


Pulmonary Medical History: 


   Denies: Hx Asthma, Hx Bronchitis, Hx COPD, Hx Pneumonia


Neurological Medical History: Denies: Hx Cerebrovascular Accident, Hx Seizures


Renal/ Medical History: Denies: Hx Peritoneal Dialysis


Musculoskeltal Medical History: Reports Hx Arthritis - RIGHT KNEE, JUSTO HANDS


Psychiatric Medical History: Reports: Hx Anxiety, Hx Depression - anxiety


Past Surgical History: Reports: Hx Gynecologic Surgery, Hx Orthopedic Surgery





- Immunizations


Hx Diphtheria, Pertussis, Tetanus Vaccination: Yes





Physical Exam





- Vital signs


Vitals: 


                                        











Temp Pulse Resp BP Pulse Ox


 


 98.2 F   60   20   193/102 H  98 


 


 12/29/20 16:42  12/29/20 16:42  12/29/20 16:42  12/29/20 16:42  12/29/20 16:42














Course





- Vital Signs


Vital signs: 


                                        











Temp Pulse Resp BP Pulse Ox


 


 98.2 F   60   20   193/102 H  98 


 


 12/29/20 16:42  12/29/20 16:42  12/29/20 16:42  12/29/20 16:42  12/29/20 16:42














Doctor's Discharge





- Discharge


Referrals: 


SOUMYA OVERTON MD [Primary Care Provider] - Follow up as needed

## 2020-12-29 NOTE — EKG REPORT
SEVERITY:- BORDERLINE ECG -

SINUS BRADYCARDIA

BORDERLINE T ABNORMALITIES, ANTERIOR LEADS

:

Confirmed by: Chino Schroeder MD 29-Dec-2020 21:35:37

## 2020-12-29 NOTE — RADIOLOGY REPORT (SQ)
EXAM DESCRIPTION:  CHEST 2 VIEWS



IMAGES COMPLETED DATE/TIME:  12/29/2020 5:34 pm



REASON FOR STUDY:  chest pain



COMPARISON:  None.



EXAM PARAMETERS:  NUMBER OF VIEWS: two views

TECHNIQUE: Digital Frontal and Lateral radiographic views of the chest acquired.

RADIATION DOSE: NA

LIMITATIONS: none



FINDINGS:  LUNGS AND PLEURA: No opacities, masses or pneumothorax. No pleural effusion.

MEDIASTINUM AND HILAR STRUCTURES: No masses or contour abnormalities.

HEART AND VASCULAR STRUCTURES: Heart normal size.  No evidence for failure.

BONES: No acute findings.

HARDWARE: Cholecystectomy clips.

OTHER: No other significant finding.



IMPRESSION:  NO ACUTE RADIOGRAPHIC FINDING IN THE CHEST.



TECHNICAL DOCUMENTATION:  JOB ID:  [id number]

 2011 [employer]- All Rights Reserved



Reading location - IP/workstation name: [email or web address]

## 2020-12-30 VITALS — DIASTOLIC BLOOD PRESSURE: 72 MMHG | SYSTOLIC BLOOD PRESSURE: 140 MMHG

## 2020-12-30 NOTE — ER DOCUMENT REPORT
ED Cardiac





- General


Chief Complaint: Chest Pain > 30


Stated Complaint: CHEST PAIN,LEFT ARM PAIN


Time Seen by Provider: 12/29/20 17:03


Primary Care Provider: 


SOUMYA OVERTON MD [Primary Care Provider] - Follow up in 3-5 days


Mode of Arrival: Wheelchair


TRAVEL OUTSIDE OF THE [place] IN LAST 30 DAYS: No





- HPI


Notes: 





Patient is a 50-year-old female with a past medical history of CVA and 

hypertension who presents with chest pain.  Patient states that she developed 

left-sided chest pain about 2 days ago.  She states it is worse with palpation 

and worse when she moves her left arm. She has a history of left frozen shoulder

syndrome.  She does not think she has had any increased physical activity.  She 

denies any shortness of breath.  No pleuritic pain.  No other symptoms.  Patient

states she went to her doctor's office today and the nurse took her blood 

pressure and it was 196 systolic.  She told the doctor and the doctor told her 

to go straight to the ER.  Patient denies any cardiac history.  She is a non-

smoker.





- Related Data


Allergies/Adverse Reactions: 


                                        





meperidine [From Demerol] Allergy (Severe, Verified 12/29/20 19:39)


   Anaphylaxis


lisinopril Allergy (Intermediate, Verified 12/29/20 19:39)


   Dizziness


latex Allergy (Verified 12/29/20 19:39)


   Hives








Home Medications: HTN MEDS.  WATER PILL.  CHRONIC PAIN MEDS





Past Medical History





- General


Information source: Patient





- Social History


Smoking Status: Never Smoker


Frequency of alcohol use: None


Drug Abuse: None


Family History: Hypertension.  denies: CAD, CVA





- Past Medical History


Cardiac Medical History: Reports: Hx Hypertension - ON MEDICATIONS


   Denies: Hx Coronary Artery Disease, Hx Heart Attack


Pulmonary Medical History: 


   Denies: Hx Asthma, Hx Bronchitis, Hx COPD, Hx Pneumonia


Neurological Medical History: Denies: Hx Cerebrovascular Accident, Hx Seizures


Renal/ Medical History: Denies: Hx Peritoneal Dialysis


Musculoskeletal Medical History: Reports Hx Arthritis - RIGHT KNEE, JUSTO HANDS


Psychiatric Medical History: Reports: Hx Anxiety, Hx Depression - anxiety


Past Surgical History: Reports: Hx Gynecologic Surgery, Hx Orthopedic Surgery





- Immunizations


Hx Diphtheria, Pertussis, Tetanus Vaccination: Yes





Review of Systems





- Review of Systems


Notes: 





CONSTITUTIONAL:  No fever, fatigue or weight loss.  


SKIN:  No rash.  


HENT:  No congestion, ear pain, or sore throat.  


EYES:  No recent vision problems or eye pain.  


CARDIOVASCULAR: Reducible chest pain on the left.


RESPIRATORY:  No cough, shortness of breath, congestion, or wheezing.  


GASTROINTESTINAL:  No abdominal pain, nausea, vomiting, bloody stools or 

diarrhea.   


MUSCULOSKELETAL:  No joint pain or swelling.  


NEUROLOGIC:  No seizures. No headache, focal weakness or sensory changes. 


HEMATOLOGIC:  No unusual bruising or bleeding.  


PSYCHIATRIC:  No depression or anxiety.





Physical Exam





- Vital signs


Vitals: 


                                        











Temp Pulse Resp BP Pulse Ox


 


 98.2 F   60   20   193/102 H  98 


 


 12/29/20 16:42  12/29/20 16:42  12/29/20 16:42  12/29/20 16:42  12/29/20 16:42














- General


General appearance: Appears well


In distress: None


Notes: 





VITAL SIGNS: Hypertensive.


GENERAL:  No acute distress, non-toxic appearance.  


HEAD:  Normal with no signs of head trauma.


EYES:  Conjunctiva normal, no discharge.  


EARS:  Hearing grossly intact.


NOSE: Normal. 


NECK:  Normal range of motion, no tenderness, supple, no lymphadenopathy, No 

adenopathy, no JVD.   


CHEST:  Clear breath sounds bilaterally.  No wheezes, rales, or rhonchi.  


CARDIAC:  Regular rate and rhythm.  S1 and S2, without murmurs, gallops, or 

rubs.  Reproducible chest pain on the left.  Worse with elevation of the left 

arm.  No rashes present.


VASCULAR:  No Edema.  


ABDOMEN: Normal and soft with no tenderness, no masses or pulsatile masses..


MUSCULOSKELETAL:  Good range of motion of all major joints. Extremities without 

clubbing, cyanosis or edema.  


NEUROLOGICAL:  Alert and oriented x 3.  No focal sensory or strength deficits.  

Speech normal.  Follows commands appropriately.


PSYCHIATRIC:  Normal Affect, judgement and mood.


SKIN:  Normal appearance with no rashes or lesions.





Course





- Re-evaluation


Re-evalutation: 





12/30/20 02:23


Patient had 2 - troponins.  Her EKG is nonacute.  Patient states that the pain 

is worse with palpation and movement.  I did not see any suspicious rashes in 

the area.  This does seem like a muscular pain.  I do not believe she has a PE 

as she has no pleuritic pain and no shortness of breath.  I will giver her 

Toradol and monitor.  Patient states she feels much better after Toradol.  Her 

blood pressure has improved to 128 systolic.  I told her to take her blood 

pressure 3 times a day and record it and show her PCP as she may need to have 

her blood pressure medication adjusted.  Patient is very agreeable to this.  She

was told to follow-up with her PCP for further work-up.  I gave her strict 

return precautions.





heart score is 2.


12/30/20 05:12








- Vital Signs


Vital signs: 


                                        











Temp Pulse Resp BP Pulse Ox


 


 98.3 F   47 L  14   140/72 H  100 


 


 12/30/20 03:20  12/30/20 03:20  12/30/20 03:20  12/30/20 03:20  12/30/20 03:20














- Laboratory Results


Result Diagrams: 


                                 12/29/20 18:11





                                 12/29/20 18:11


Laboratory Results Interpreted: 


                                        











  12/29/20





  18:11


 


ALT  53 H











Critical Laboratory Results Reviewed: No Critical Results





- Radiology Results


Critical Radiology Results Reviewed: No Critical Results





- EKG Interpretation by Me


EKG shows normal: Sinus rhythm


Rate: Normal


Rhythm: NSR


When compared to previous EKG there are: Previous EKG unavailable


Additional EKG results interpreted by me: 





12/30/20 02:56


Sinus rhythm at a rate of 54.  QTc 417.  No acute ST changes.  Artifact present.





Discharge





- Discharge


Clinical Impression: 


 Chest wall pain





Hypertension


Qualifiers:


 Hypertension type: unspecified Qualified Code(s): I10 - Essential (primary) 

hypertension





Condition: Stable


Disposition: HOME, SELF-CARE


Additional Instructions: 


Your work-up today is reassuring.  You can take Tylenol and ibuprofen for pain 

if needed.  You may also put warm compresses on the area.  Please follow-up with

your family doctor.  He may need to order an echocardiogram or stress test.  

Please return to the ER immediately for any return or change of symptoms.


Referrals: 


SOUMYA OVERTON MD [Primary Care Provider] - Follow up in 3-5 days